# Patient Record
Sex: MALE | Race: WHITE | NOT HISPANIC OR LATINO | Employment: OTHER | RURAL
[De-identification: names, ages, dates, MRNs, and addresses within clinical notes are randomized per-mention and may not be internally consistent; named-entity substitution may affect disease eponyms.]

---

## 2019-11-12 ENCOUNTER — HISTORICAL (OUTPATIENT)
Dept: ADMINISTRATIVE | Facility: HOSPITAL | Age: 69
End: 2019-11-12

## 2019-11-14 LAB
LAB AP CLINICAL INFORMATION: NORMAL
LAB AP COMMENTS: NORMAL
LAB AP DIAGNOSIS - HISTORICAL: NORMAL
LAB AP GROSS PATHOLOGY - HISTORICAL: NORMAL
LAB AP SPECIMEN SUBMITTED - HISTORICAL: NORMAL

## 2020-10-15 ENCOUNTER — HISTORICAL (OUTPATIENT)
Dept: ADMINISTRATIVE | Facility: HOSPITAL | Age: 70
End: 2020-10-15

## 2020-10-15 LAB
ALBUMIN SERPL BCP-MCNC: 4 G/DL (ref 3.5–5)
ALBUMIN/GLOB SERPL: 1.1 {RATIO}
ALP SERPL-CCNC: 58 U/L (ref 45–115)
ALT SERPL W P-5'-P-CCNC: 60 U/L (ref 16–61)
ANION GAP SERPL CALCULATED.3IONS-SCNC: 8.8 MMOL/L (ref 7–16)
AST SERPL W P-5'-P-CCNC: 34 U/L (ref 15–37)
BASOPHILS # BLD AUTO: 0.03 X10E3/UL (ref 0–0.2)
BASOPHILS NFR BLD AUTO: 0.5 % (ref 0–1)
BILIRUB SERPL-MCNC: 0.6 MG/DL (ref 0–1.2)
BUN SERPL-MCNC: 16 MG/DL (ref 7–18)
BUN/CREAT SERPL: 12
CALCIUM SERPL-MCNC: 9.2 MG/DL (ref 8.5–10.1)
CHLORIDE SERPL-SCNC: 107 MMOL/L (ref 98–107)
CHOLEST SERPL-MCNC: 145 MG/DL
CHOLEST/HDLC SERPL: 3.5 {RATIO}
CO2 SERPL-SCNC: 30 MMOL/L (ref 21–32)
CREAT SERPL-MCNC: 1.31 MG/DL (ref 0.7–1.3)
EOSINOPHIL # BLD AUTO: 0.14 X10E3/UL (ref 0–0.5)
EOSINOPHIL NFR BLD AUTO: 2.1 % (ref 1–4)
ERYTHROCYTE [DISTWIDTH] IN BLOOD BY AUTOMATED COUNT: 12.1 % (ref 11.5–14.5)
EST. AVERAGE GLUCOSE BLD GHB EST-MCNC: 94 MG/DL
GLOBULIN SER-MCNC: 3.5 G/DL (ref 2–4)
GLUCOSE SERPL-MCNC: 114 MG/DL (ref 74–106)
HBA1C MFR BLD HPLC: 5.4 % (ref 4.5–6.6)
HCT VFR BLD AUTO: 48.4 % (ref 40–54)
HDLC SERPL-MCNC: 41 MG/DL
HGB BLD-MCNC: 16.2 G/DL (ref 13.5–18)
IMM GRANULOCYTES # BLD AUTO: 0.02 X10E3/UL (ref 0–0.04)
IMM GRANULOCYTES NFR BLD: 0.3 % (ref 0–0.4)
LDLC SERPL CALC-MCNC: 69 MG/DL
LYMPHOCYTES # BLD AUTO: 1.1 X10E3/UL (ref 1–4.8)
LYMPHOCYTES NFR BLD AUTO: 16.5 % (ref 27–41)
MAGNESIUM SERPL-MCNC: 2.4 MG/DL (ref 1.7–2.3)
MCH RBC QN AUTO: 33.4 PG (ref 27–31)
MCHC RBC AUTO-ENTMCNC: 33.5 G/DL (ref 32–36)
MCV RBC AUTO: 99.8 FL (ref 80–96)
MONOCYTES # BLD AUTO: 0.54 X10E3/UL (ref 0–0.8)
MONOCYTES NFR BLD AUTO: 8.1 % (ref 2–6)
MPC BLD CALC-MCNC: 10.4 FL (ref 9.4–12.4)
NEUTROPHILS # BLD AUTO: 4.82 X10E3/UL (ref 1.8–7.7)
NEUTROPHILS NFR BLD AUTO: 72.5 % (ref 53–65)
NRBC # BLD AUTO: 0 X10E3/UL (ref 0–0)
NRBC, AUTO (.00): 0 /100 (ref 0–0)
PLATELET # BLD AUTO: 213 X10E3/UL (ref 150–400)
POTASSIUM SERPL-SCNC: 4.8 MMOL/L (ref 3.5–5.1)
PROT SERPL-MCNC: 7.5 G/DL (ref 6.4–8.2)
RBC # BLD AUTO: 4.85 X10E6/UL (ref 4.6–6.2)
SODIUM SERPL-SCNC: 141 MMOL/L (ref 136–145)
TRIGL SERPL-MCNC: 173 MG/DL
WBC # BLD AUTO: 6.65 X10E3/UL (ref 4.5–11)

## 2021-05-20 LAB
LEFT EYE DM RETINOPATHY: NEGATIVE
RIGHT EYE DM RETINOPATHY: NEGATIVE

## 2021-07-08 DIAGNOSIS — I10 ESSENTIAL HYPERTENSION: Primary | ICD-10-CM

## 2021-07-08 RX ORDER — ACETAMINOPHEN 500 MG
TABLET ORAL NIGHTLY
COMMUNITY
End: 2024-01-22

## 2021-07-08 RX ORDER — IBUPROFEN 100 MG/5ML
1000 SUSPENSION, ORAL (FINAL DOSE FORM) ORAL 2 TIMES DAILY
COMMUNITY

## 2021-07-08 RX ORDER — CHOLECALCIFEROL (VITAMIN D3) 25 MCG
1000 TABLET ORAL DAILY
COMMUNITY

## 2021-07-08 RX ORDER — GLUCOSAMINE/CHONDRO SU A 500-400 MG
1 TABLET ORAL 3 TIMES DAILY
COMMUNITY

## 2021-07-08 RX ORDER — LISINOPRIL 20 MG/1
10 TABLET ORAL DAILY
COMMUNITY
End: 2021-07-08 | Stop reason: SDUPTHER

## 2021-07-08 RX ORDER — MAGNESIUM 250 MG
1 TABLET ORAL DAILY
COMMUNITY

## 2021-07-08 RX ORDER — QUINIDINE SULFATE 200 MG
1 TABLET ORAL DAILY
COMMUNITY
End: 2024-01-22

## 2021-07-08 RX ORDER — LISINOPRIL 20 MG/1
10 TABLET ORAL DAILY
Qty: 45 TABLET | Refills: 1 | Status: SHIPPED | OUTPATIENT
Start: 2021-07-08 | End: 2021-12-08 | Stop reason: SDUPTHER

## 2021-07-08 RX ORDER — NIFEDIPINE 30 MG/1
30 TABLET, FILM COATED, EXTENDED RELEASE ORAL DAILY
COMMUNITY
End: 2021-12-08 | Stop reason: SDUPTHER

## 2021-07-08 RX ORDER — ASPIRIN 81 MG/1
81 TABLET ORAL DAILY
COMMUNITY

## 2021-07-08 RX ORDER — ATORVASTATIN CALCIUM 10 MG/1
10 TABLET, FILM COATED ORAL DAILY
COMMUNITY
End: 2021-12-08 | Stop reason: SDUPTHER

## 2021-07-08 RX ORDER — MULTIVITAMIN
1 TABLET ORAL DAILY
COMMUNITY

## 2021-07-08 RX ORDER — UBIDECARENONE 30 MG
30 CAPSULE ORAL DAILY
COMMUNITY
End: 2021-12-08 | Stop reason: SDUPTHER

## 2021-07-08 RX ORDER — SILDENAFIL 50 MG/1
50 TABLET, FILM COATED ORAL DAILY PRN
COMMUNITY
End: 2024-01-22

## 2021-07-08 RX ORDER — PHENYLEPHRINE HCL 10 MG
500 TABLET ORAL DAILY
COMMUNITY
End: 2021-12-08 | Stop reason: SDUPTHER

## 2021-07-08 RX ORDER — AMOXICILLIN 500 MG
CAPSULE ORAL DAILY
COMMUNITY

## 2021-12-08 ENCOUNTER — OFFICE VISIT (OUTPATIENT)
Dept: FAMILY MEDICINE | Facility: CLINIC | Age: 71
End: 2021-12-08
Payer: MEDICARE

## 2021-12-08 VITALS
BODY MASS INDEX: 29.75 KG/M2 | OXYGEN SATURATION: 98 % | HEART RATE: 82 BPM | WEIGHT: 219.63 LBS | RESPIRATION RATE: 18 BRPM | DIASTOLIC BLOOD PRESSURE: 82 MMHG | HEIGHT: 72 IN | SYSTOLIC BLOOD PRESSURE: 118 MMHG | TEMPERATURE: 98 F

## 2021-12-08 DIAGNOSIS — E78.5 HYPERLIPIDEMIA, UNSPECIFIED HYPERLIPIDEMIA TYPE: ICD-10-CM

## 2021-12-08 DIAGNOSIS — E55.9 VITAMIN D DEFICIENCY: ICD-10-CM

## 2021-12-08 DIAGNOSIS — Z79.899 OTHER LONG TERM (CURRENT) DRUG THERAPY: ICD-10-CM

## 2021-12-08 DIAGNOSIS — I10 HYPERTENSION, UNSPECIFIED TYPE: Primary | ICD-10-CM

## 2021-12-08 DIAGNOSIS — E11.9 TYPE 2 DIABETES MELLITUS WITHOUT COMPLICATION, WITHOUT LONG-TERM CURRENT USE OF INSULIN: ICD-10-CM

## 2021-12-08 DIAGNOSIS — Z12.5 ENCOUNTER FOR SCREENING FOR MALIGNANT NEOPLASM OF PROSTATE: ICD-10-CM

## 2021-12-08 LAB
25(OH)D3 SERPL-MCNC: 20.1 NG/ML
ALBUMIN SERPL BCP-MCNC: 4.2 G/DL (ref 3.5–5)
ALBUMIN/GLOB SERPL: 1.1 {RATIO}
ALP SERPL-CCNC: 52 U/L (ref 45–115)
ALT SERPL W P-5'-P-CCNC: 69 U/L (ref 16–61)
AMYLASE SERPL-CCNC: 75 U/L (ref 25–115)
ANION GAP SERPL CALCULATED.3IONS-SCNC: 8 MMOL/L (ref 7–16)
AST SERPL W P-5'-P-CCNC: 28 U/L (ref 15–37)
BASOPHILS # BLD AUTO: 0.03 K/UL (ref 0–0.2)
BASOPHILS NFR BLD AUTO: 0.5 % (ref 0–1)
BILIRUB SERPL-MCNC: 0.4 MG/DL (ref 0–1.2)
BUN SERPL-MCNC: 16 MG/DL (ref 7–18)
BUN/CREAT SERPL: 11 (ref 6–20)
CALCIUM SERPL-MCNC: 9.2 MG/DL (ref 8.5–10.1)
CHLORIDE SERPL-SCNC: 104 MMOL/L (ref 98–107)
CHOLEST SERPL-MCNC: 237 MG/DL (ref 0–200)
CHOLEST/HDLC SERPL: 7 {RATIO}
CK SERPL-CCNC: 46 U/L (ref 39–308)
CO2 SERPL-SCNC: 32 MMOL/L (ref 21–32)
CREAT SERPL-MCNC: 1.4 MG/DL (ref 0.7–1.3)
DIFFERENTIAL METHOD BLD: ABNORMAL
EOSINOPHIL # BLD AUTO: 0.09 K/UL (ref 0–0.5)
EOSINOPHIL NFR BLD AUTO: 1.5 % (ref 1–4)
ERYTHROCYTE [DISTWIDTH] IN BLOOD BY AUTOMATED COUNT: 11.9 % (ref 11.5–14.5)
EST. AVERAGE GLUCOSE BLD GHB EST-MCNC: 107 MG/DL
GLOBULIN SER-MCNC: 4 G/DL (ref 2–4)
GLUCOSE SERPL-MCNC: 127 MG/DL (ref 74–106)
HBA1C MFR BLD HPLC: 5.8 % (ref 4.5–6.6)
HCT VFR BLD AUTO: 49.3 % (ref 40–54)
HDLC SERPL-MCNC: 34 MG/DL (ref 40–60)
HGB BLD-MCNC: 17 G/DL (ref 13.5–18)
IMM GRANULOCYTES # BLD AUTO: 0.02 K/UL (ref 0–0.04)
IMM GRANULOCYTES NFR BLD: 0.3 % (ref 0–0.4)
LDLC SERPL CALC-MCNC: ABNORMAL MG/DL
LDLC/HDLC SERPL: ABNORMAL {RATIO}
LIPASE SERPL-CCNC: 258 U/L (ref 73–393)
LYMPHOCYTES # BLD AUTO: 0.97 K/UL (ref 1–4.8)
LYMPHOCYTES NFR BLD AUTO: 15.7 % (ref 27–41)
MCH RBC QN AUTO: 33.7 PG (ref 27–31)
MCHC RBC AUTO-ENTMCNC: 34.5 G/DL (ref 32–36)
MCV RBC AUTO: 97.6 FL (ref 80–96)
MONOCYTES # BLD AUTO: 0.46 K/UL (ref 0–0.8)
MONOCYTES NFR BLD AUTO: 7.5 % (ref 2–6)
MPC BLD CALC-MCNC: 9.9 FL (ref 9.4–12.4)
NEUTROPHILS # BLD AUTO: 4.6 K/UL (ref 1.8–7.7)
NEUTROPHILS NFR BLD AUTO: 74.5 % (ref 53–65)
NONHDLC SERPL-MCNC: 203 MG/DL
NRBC # BLD AUTO: 0 X10E3/UL
NRBC, AUTO (.00): 0 %
PLATELET # BLD AUTO: 233 K/UL (ref 150–400)
POTASSIUM SERPL-SCNC: 4.6 MMOL/L (ref 3.5–5.1)
PROT SERPL-MCNC: 8.2 G/DL (ref 6.4–8.2)
PSA SERPL-MCNC: <0.01 NG/ML (ref 0–4.4)
RBC # BLD AUTO: 5.05 M/UL (ref 4.6–6.2)
SODIUM SERPL-SCNC: 139 MMOL/L (ref 136–145)
TRIGL SERPL-MCNC: 415 MG/DL (ref 35–150)
VLDLC SERPL-MCNC: ABNORMAL MG/DL
WBC # BLD AUTO: 6.17 K/UL (ref 4.5–11)

## 2021-12-08 PROCEDURE — 82550 ASSAY OF CK (CPK): CPT | Mod: ,,, | Performed by: CLINICAL MEDICAL LABORATORY

## 2021-12-08 PROCEDURE — 80061 LIPID PANEL: ICD-10-PCS | Mod: ,,, | Performed by: CLINICAL MEDICAL LABORATORY

## 2021-12-08 PROCEDURE — 83036 HEMOGLOBIN GLYCOSYLATED A1C: CPT | Mod: ,,, | Performed by: CLINICAL MEDICAL LABORATORY

## 2021-12-08 PROCEDURE — 85025 COMPLETE CBC W/AUTO DIFF WBC: CPT | Mod: ,,, | Performed by: CLINICAL MEDICAL LABORATORY

## 2021-12-08 PROCEDURE — 85025 CBC WITH DIFFERENTIAL: ICD-10-PCS | Mod: ,,, | Performed by: CLINICAL MEDICAL LABORATORY

## 2021-12-08 PROCEDURE — 99214 OFFICE O/P EST MOD 30 MIN: CPT | Mod: ,,, | Performed by: FAMILY MEDICINE

## 2021-12-08 PROCEDURE — 82306 VITAMIN D 25 HYDROXY: CPT | Mod: ,,, | Performed by: CLINICAL MEDICAL LABORATORY

## 2021-12-08 PROCEDURE — 83690 ASSAY OF LIPASE: CPT | Mod: ,,, | Performed by: CLINICAL MEDICAL LABORATORY

## 2021-12-08 PROCEDURE — G0103 PSA, SCREENING: ICD-10-PCS | Mod: ,,, | Performed by: CLINICAL MEDICAL LABORATORY

## 2021-12-08 PROCEDURE — 80061 LIPID PANEL: CPT | Mod: ,,, | Performed by: CLINICAL MEDICAL LABORATORY

## 2021-12-08 PROCEDURE — 83690 LIPASE: ICD-10-PCS | Mod: ,,, | Performed by: CLINICAL MEDICAL LABORATORY

## 2021-12-08 PROCEDURE — 80053 COMPREHENSIVE METABOLIC PANEL: ICD-10-PCS | Mod: ,,, | Performed by: CLINICAL MEDICAL LABORATORY

## 2021-12-08 PROCEDURE — 83036 HEMOGLOBIN A1C: ICD-10-PCS | Mod: ,,, | Performed by: CLINICAL MEDICAL LABORATORY

## 2021-12-08 PROCEDURE — 82306 VITAMIN D: ICD-10-PCS | Mod: ,,, | Performed by: CLINICAL MEDICAL LABORATORY

## 2021-12-08 PROCEDURE — 82150 ASSAY OF AMYLASE: CPT | Mod: ,,, | Performed by: CLINICAL MEDICAL LABORATORY

## 2021-12-08 PROCEDURE — 99214 PR OFFICE/OUTPT VISIT, EST, LEVL IV, 30-39 MIN: ICD-10-PCS | Mod: ,,, | Performed by: FAMILY MEDICINE

## 2021-12-08 PROCEDURE — 82550 CK: ICD-10-PCS | Mod: ,,, | Performed by: CLINICAL MEDICAL LABORATORY

## 2021-12-08 PROCEDURE — 82150 AMYLASE: ICD-10-PCS | Mod: ,,, | Performed by: CLINICAL MEDICAL LABORATORY

## 2021-12-08 PROCEDURE — G0103 PSA SCREENING: HCPCS | Mod: ,,, | Performed by: CLINICAL MEDICAL LABORATORY

## 2021-12-08 PROCEDURE — 80053 COMPREHEN METABOLIC PANEL: CPT | Mod: ,,, | Performed by: CLINICAL MEDICAL LABORATORY

## 2021-12-08 RX ORDER — GLUCOSAMINE/CHONDR SU A SOD 750-600 MG
1 TABLET ORAL EVERY OTHER DAY
COMMUNITY

## 2021-12-08 RX ORDER — METFORMIN HYDROCHLORIDE 500 MG/1
500 TABLET ORAL DAILY
Qty: 90 TABLET | Refills: 1 | Status: SHIPPED | OUTPATIENT
Start: 2021-12-08 | End: 2022-05-13

## 2021-12-08 RX ORDER — ATORVASTATIN CALCIUM 10 MG/1
TABLET, FILM COATED ORAL
Qty: 60 TABLET | Refills: 1 | Status: SHIPPED | OUTPATIENT
Start: 2021-12-08 | End: 2022-03-08

## 2021-12-08 RX ORDER — DEXTROSE 4 G
1 TABLET,CHEWABLE ORAL DAILY
COMMUNITY
Start: 2021-09-28

## 2021-12-08 RX ORDER — NIFEDIPINE 30 MG/1
30 TABLET, FILM COATED, EXTENDED RELEASE ORAL DAILY
Qty: 90 TABLET | Refills: 1 | Status: SHIPPED | OUTPATIENT
Start: 2021-12-08

## 2021-12-08 RX ORDER — IBUPROFEN 400 MG/1
400 TABLET ORAL EVERY 6 HOURS PRN
COMMUNITY

## 2021-12-08 RX ORDER — ACETAMINOPHEN 500 MG
1 TABLET ORAL NIGHTLY PRN
COMMUNITY
End: 2024-01-22

## 2021-12-08 RX ORDER — EPINEPHRINE 0.22MG
1 AEROSOL WITH ADAPTER (ML) INHALATION DAILY
COMMUNITY

## 2021-12-08 RX ORDER — LISINOPRIL 10 MG/1
10 TABLET ORAL DAILY
Qty: 90 TABLET | Refills: 1 | Status: SHIPPED | OUTPATIENT
Start: 2021-12-08 | End: 2022-03-08 | Stop reason: SDUPTHER

## 2021-12-08 RX ORDER — NIFEDIPINE 30 MG/1
1 TABLET, FILM COATED, EXTENDED RELEASE ORAL DAILY
COMMUNITY
End: 2021-12-08 | Stop reason: SDUPTHER

## 2021-12-08 RX ORDER — LISINOPRIL 10 MG/1
1 TABLET ORAL DAILY
COMMUNITY
End: 2021-12-08 | Stop reason: SDUPTHER

## 2021-12-08 RX ORDER — OMEGA-3/DHA/EPA/FISH OIL 300-1000MG
1 CAPSULE,DELAYED RELEASE (ENTERIC COATED) ORAL EVERY OTHER DAY
COMMUNITY
End: 2024-01-22

## 2021-12-08 RX ORDER — UBIDECARENONE 75 MG
1 CAPSULE ORAL DAILY
COMMUNITY

## 2021-12-08 RX ORDER — CALCIUM CARBONATE 260MG(650)
1 TABLET,CHEWABLE ORAL DAILY
COMMUNITY

## 2021-12-08 RX ORDER — PHENYLEPHRINE HCL 10 MG
2 TABLET ORAL DAILY
COMMUNITY

## 2021-12-08 RX ORDER — IBUPROFEN 100 MG/5ML
1 SUSPENSION, ORAL (FINAL DOSE FORM) ORAL DAILY
COMMUNITY
End: 2021-12-08 | Stop reason: SDUPTHER

## 2021-12-22 DIAGNOSIS — Z87.891 FORMER CIGARETTE SMOKER: Primary | ICD-10-CM

## 2022-01-03 ENCOUNTER — HOSPITAL ENCOUNTER (OUTPATIENT)
Dept: RADIOLOGY | Facility: HOSPITAL | Age: 72
Discharge: HOME | End: 2022-01-03
Attending: FAMILY MEDICINE
Payer: MEDICARE

## 2022-01-03 DIAGNOSIS — Z87.891 FORMER CIGARETTE SMOKER: ICD-10-CM

## 2022-01-03 PROCEDURE — 76706 US ABDL AORTA SCREEN AAA: CPT | Mod: TC

## 2022-01-10 ENCOUNTER — PATIENT OUTREACH (OUTPATIENT)
Dept: FAMILY MEDICINE | Facility: CLINIC | Age: 72
End: 2022-01-10
Payer: MEDICARE

## 2022-01-10 ENCOUNTER — OFFICE VISIT (OUTPATIENT)
Dept: FAMILY MEDICINE | Facility: CLINIC | Age: 72
End: 2022-01-10
Payer: MEDICARE

## 2022-01-10 VITALS
BODY MASS INDEX: 30.21 KG/M2 | OXYGEN SATURATION: 98 % | RESPIRATION RATE: 18 BRPM | DIASTOLIC BLOOD PRESSURE: 70 MMHG | HEIGHT: 71 IN | TEMPERATURE: 98 F | SYSTOLIC BLOOD PRESSURE: 132 MMHG | WEIGHT: 215.81 LBS | HEART RATE: 73 BPM

## 2022-01-10 DIAGNOSIS — E11.9 TYPE 2 DIABETES MELLITUS WITHOUT COMPLICATION, WITHOUT LONG-TERM CURRENT USE OF INSULIN: Primary | ICD-10-CM

## 2022-01-10 DIAGNOSIS — I10 PRIMARY HYPERTENSION: ICD-10-CM

## 2022-01-10 DIAGNOSIS — M15.9 PRIMARY OSTEOARTHRITIS INVOLVING MULTIPLE JOINTS: ICD-10-CM

## 2022-01-10 DIAGNOSIS — I25.10 MULTIPLE VESSEL CORONARY ARTERY DISEASE: ICD-10-CM

## 2022-01-10 PROCEDURE — G0439 PR MEDICARE ANNUAL WELLNESS SUBSEQUENT VISIT: ICD-10-PCS | Mod: ,,, | Performed by: FAMILY MEDICINE

## 2022-01-10 PROCEDURE — G0439 PPPS, SUBSEQ VISIT: HCPCS | Mod: ,,, | Performed by: FAMILY MEDICINE

## 2022-01-10 RX ORDER — IVERMECTIN 3 MG/1
22.75 TABLET ORAL WEEKLY
COMMUNITY

## 2022-01-10 NOTE — PATIENT INSTRUCTIONS
Counseling and Referral of Other Preventative  (Italic type indicates deductible and co-insurance are waived)    Patient Name: Darrell Urbina  Today's Date: 1/10/2022    Health Maintenance       Date Due Completion Date    Eye Exam Never done ---    Hepatitis C Screening 01/10/2022 (Originally 1950) ---    Influenza Vaccine (1) 06/30/2022 (Originally 9/1/2021) ---    COVID-19 Vaccine (1) 12/08/2022 (Originally 1/31/1955) ---    Diabetes Urine Screening 12/08/2022 (Originally 4/20/2022) 4/20/2021    TETANUS VACCINE 01/10/2023 (Originally 1/31/1968) ---    Shingles Vaccine (2 of 3) 01/10/2023 (Originally 1/22/2014) 11/27/2013    Pneumococcal Vaccines (Age 65+) (3 of 4 - PPSV23) 01/10/2023 (Originally 12/10/2019) 10/15/2019    Hemoglobin A1c 03/08/2022 12/8/2021    PROSTATE-SPECIFIC ANTIGEN 12/08/2022 12/8/2021    Foot Exam 12/08/2022 12/8/2021    Override on 12/8/2021: Done    Lipid Panel 12/08/2022 12/8/2021    Colorectal Cancer Screening 02/05/2023 2/5/2018        No orders of the defined types were placed in this encounter.  Patient Education       Preventing Falls   The Basics   Written by the doctors and editors at [employer]   Am I at risk of falling? -- Your risk of falling increases as you grow older. That's because getting older can make it harder to walk steadily and keep your balance. Also, the effects of falls are more serious in older people.  Overall, 3 to 4 out of every 10 people over the age of 65 fall each year. Up to 75 percent of people who fracture a hip never recover to the point they were before they had their fracture. If you have fallen in the past, you are at higher risk of falling again.  Several things can increase your risk of a fall, including:  · Illness  · A change in the medicines you take  · An unsafe or unfamiliar setting (for example, a room with rugs or furniture that might trip you, or an area you don't know well)  How can my doctor help me to avoid falling? -- Your doctor can  talk to you about the following things:  · Past falls - It is important to tell your doctor about any times you have fallen or almost fallen. He or she can then suggest ways to prevent another fall.  · Your health conditions - Some health problems can put you at risk of falling. These include conditions that affect eyesight, hearing, muscle strength, or balance.  · The medicines you take - Certain medicines can increase the risk of falling. These include some medicines that are used for sleeping problems, anxiety, high blood pressure, or depression. Adding new medicines, or changing doses of some medicines, can also affect your risk of falling.  The more your doctor knows about your situation, the better he or she will be able to help you. For example, if you fell because you have a condition that causes pain, your doctor might suggest treatments to deal with the pain. Or if one of your medicines is making you dizzy and more likely to fall, your doctor might switch you to a different medicine.  Is there anything I can do on my own? -- Yes. To help keep from falling, you can:  · Make your home safer - To avoid falling at home, get rid of things that might make you trip or slip. This might include furniture, electrical cords, clutter, and loose rugs (figure 1). Keep your home well-lit so that you can easily see where you are going. Avoid storing things in high places so you don't have to reach or climb.  · Wear sturdy shoes that fit well - Wearing shoes with high heels or slippery soles, or shoes that are too loose, can lead to falls. Walking around in bare feet, or only socks, can also increase your risk of falling.  · Take vitamin D pills - Taking vitamin D might lower the risk of falls in older people. This is because vitamin D helps make bones and muscles stronger. Your doctor can talk to you about whether you should take extra vitamin D, and how much.  · Stay active - Exercising on a regular basis can help lower  your risk of falling. It might also help prevent you from getting hurt if you do fall. It is best to do a few different activities that help with both strength and balance. There are many kinds of exercise that can be safe for older people. These include walking, swimming, and Joshua Chi (a Chinese martial art that involves slow, gentle movements).  · Use a cane, walker, and other safety devices - If your doctor recommends that you use a cane or walker, be sure that it's the right size and you know how to use it. There are other devices that might help you avoid falling, too. These include grab bars or a sturdy seat for the shower, non-slip bath mats, and hand rails or treads for the stairs (to prevent slipping).  If you worry that you could fall, there are also alarm buttons that let you call for help if you fall and can't get up.  What should I do if I fall? -- If you fall, see your doctor right away, even if you aren't hurt. Your doctor can try to figure out what caused you to fall, and how likely you are to fall again. He or she will do an exam and talk to you about your health problems, medicines, and activities. Then he or she can suggest things you can do to avoid falling again.  Many older people have a hard time recovering after a fall. Doing things to prevent falling can help you to protect your health and independence.  All topics are updated as new evidence becomes available and our peer review process is complete.  This topic retrieved from [employer] on: Sep 21, 2021.  Topic 11561 Version 18.0  Release: 29.4.2 - C29.263  © 2021 UpToDate, Inc. and/or its affiliates. All rights reserved.  figure 1: How to avoid falling at home     This picture shows some of the things that can cause a fall in your home. Look around and remove any loose rugs, electrical cords, clutter, or furniture that could trip you.  Graphic 30352 Version 1.0    Consumer Information Use and Disclaimer   This information is not specific  medical advice and does not replace information you receive from your health care provider. This is only a brief summary of general information. It does NOT include all information about conditions, illnesses, injuries, tests, procedures, treatments, therapies, discharge instructions or life-style choices that may apply to you. You must talk with your health care provider for complete information about your health and treatment options. This information should not be used to decide whether or not to accept your health care provider's advice, instructions or recommendations. Only your health care provider has the knowledge and training to provide advice that is right for you. The use of this information is governed by the [employer] End User License Agreement, available at [email or web address]://[email or web address]/[email or web address]/jasper.The use of [employer] content is governed by the [employer] Terms of Use. ©2021 UpToDate, Inc. All rights reserved.  Copyright   © 2021 UpToDate, Inc. and/or its affiliates. All rights reserved.  Patient Education       Exercise and Movement   The Basics   Written by the doctors and editors at [employer]   What are the benefits of movement? -- Moving your body has many benefits. It can:  · Burn calories, which helps people control their weight  · Help control blood sugar levels in people with diabetes  · Lower blood pressure, especially in people with high blood pressure  · Lower stress and help with depression and anxiety  · Keep bones strong, so they don't get thin and break easily  · Lower the chance of dying from heart disease  Adding even small amounts of physical activity to your daily routine can improve your health.  What are the main types of exercise? -- There are 3 main types of exercise. They are:  · Aerobic exercise - Aerobic exercise raises a person's heart rate. Examples of aerobic exercise are walking, running, dancing, riding a bike, or swimming.  · Resistance training -  Resistance training helps make your muscles stronger. People can do this type of exercise using weights, exercise bands, or weight machines. You can also do this type of exercise using your own body weight, as with push-ups, or by lifting items in your home, like jugs of water.   · Stretching - Stretching exercises help your muscles and joints move more easily.  It's important to have all 3 types of exercise in your exercise program. That way, your body, muscles, and joints can be as healthy as possible.  Should I talk to my doctor or nurse before I start exercising? -- If you have not exercised before or have not exercised in a long time, talk with your doctor or nurse before you start a very active exercise program.  If you have heart disease or risk factors for heart disease (like high blood pressure or diabetes), your doctor or nurse might recommend that you have an exercise test before starting an exercise program.  When you start an exercise program, start slowly. For example, do the exercise at a slow pace or for a few minutes only. Over time, you can exercise faster and for longer periods of time.  What should I do when I exercise? -- Each time you exercise, you should:  · Warm up - Warming up can help keep you from hurting your muscles when you exercise. To warm up, do a light aerobic exercise (such as walking slowly) or stretch for 5 to 10 minutes.  · Work out - You should try to get a mix of aerobic exercise, resistance training, and stretching. During an aerobic workout, you can walk fast, swim, run, or use an exercise machine, for example. Other activities, like dancing or playing tennis, are also forms of aerobic exercise. You should also take time to stretch all of your joints, including your neck, shoulders, back, hips, and knees. At least 2 times a week, you can do resistance training exercises as part of your workout.  · Cool down - Cooling down helps keep you from feeling dizzy after you exercise  and helps prevent muscle cramps. To cool down, you can stretch or do a light aerobic exercise for 5 minutes.  Some people go to a gym or do group exercise classes. But you can exercise even without these things. Some exercises can be done even in a small space. You can also try online videos or smartphone apps to get ideas for different types of exercise.   How often should I exercise? -- Doctors recommend that people exercise at least 30 minutes a day, on 5 or more days of the week.  If you can't exercise for 30 minutes straight, try to exercise for 10 minutes at a time, 3 or 4 times a day. Even exercising for shorter amounts of time is good for you, especially if it means spending less time sitting.   When should I call my doctor or nurse? -- If you have any of the following symptoms when you exercise, stop exercising and call your doctor or nurse right away:  · Pain or pressure in your chest, arms, throat, jaw, or back  · Nausea or vomiting  · Feeling like your heart is fluttering or racing very fast  · Feeling dizzy or faint  What if I don't have time to exercise? -- Many people have very busy lives and might not think that they have time to exercise. But it's important to try to find time to exercise, even if you are tired or work a lot. Exercise can increase your energy level, which can make you feel better and might even help you get more work done.  Even if it's hard to set aside a lot of time to exercise, you can still improve your health by moving your body more. There are many ways that you can be more active. For example, you can:  · Take the stairs instead of the elevator  · Park in a parking space that is farther away from the door  · Take a longer route when you walk from one place to another  Spending a lot of time sitting still - for example, watching television or working on the computer - can be bad for your health. Try to get up and move around whenever you can. Even small amounts of movement,  like taking short walks, doing household chores, or gardening, can help improve your health. Finding activities you enjoy, or doing them with other people, can help you add more movement into your daily life.  What else should I do when I exercise? -- To exercise safely and avoid problems, it's important to:  · Drink fluids during and after exercising (but avoid drinks with a lot of caffeine or sugar)  · Avoid exercising outside if it is too hot or cold  · Wear layers of clothes, so that you can take them off if you get too hot  · Wear shoes that fit well and support your feet  · Be aware of your surroundings if you exercise outside  All topics are updated as new evidence becomes available and our peer review process is complete.  This topic retrieved from [employer] on: Sep 21, 2021.  Topic 66531 Version 21.0  Release: 29.4.2 - C29.263  © 2021 UpToDate, Inc. and/or its affiliates. All rights reserved.  Consumer Information Use and Disclaimer   This information is not specific medical advice and does not replace information you receive from your health care provider. This is only a brief summary of general information. It does NOT include all information about conditions, illnesses, injuries, tests, procedures, treatments, therapies, discharge instructions or life-style choices that may apply to you. You must talk with your health care provider for complete information about your health and treatment options. This information should not be used to decide whether or not to accept your health care provider's advice, instructions or recommendations. Only your health care provider has the knowledge and training to provide advice that is right for you. The use of this information is governed by the [employer] End User License Agreement, available at https://[email or web address]/en/solutions/[employer]/about/jasper.The use of [employer] content is governed by the [employer] Terms of Use. ©2021 UpToDate, Inc. All rights  "reserved.  Copyright   © 2021 UpToDate, Inc. and/or its affiliates. All rights reserved.  Patient Education       Diabetes and Diet   The Basics   Written by the doctors and editors at [employer]   Why is diet important in diabetes? -- Diet is important because it is part of diabetes treatment. Many people need to change what they eat and how much they eat to help treat their diabetes. It is important for people to treat their diabetes so that they:  · Keep their blood sugar at or near a normal level  · Prevent long-term problems, such as heart or kidney problems, that can happen in people with diabetes  Changing your diet can also help treat obesity, high blood pressure, and high cholesterol. These conditions can affect people with diabetes and can lead to future problems, such as heart attacks or strokes.  Who will work with me to change my diet? -- Your doctor or nurse will work with you to make a food plan to change your diet. They might also recommend that you work with a "dietitian." A dietitian is an expert on food and eating.  Do I need to eat at the same times every day? -- When and how often you should eat depends, in part, on the diabetes medicines you take. For example:  · People who take about the same amount of insulin at the same time each day (called a "fixed regimen") should eat meals at the same times. This is also true for people who take pills that increase insulin levels, such as sulfonylureas. Eating meals at the same time every day helps prevent low blood sugar.  · People who adjust the dose and timing of their insulin each day (called a "flexible regimen") do not always have to eat meals at the same time. That's because they can time their insulin dose for before they plan to eat, and also adjust the dose for how much they plan to eat.  · People who take medicines that don't usually cause low blood sugar, such as metformin, don't have to eat meals at the same time every day.  What do I need to " "think about when planning what to eat? -- Our bodies break down the food we eat into small pieces called carbohydrates, proteins, and fats.  When planning what to eat, people with diabetes need to think about:  · Carbohydrates (or "carbs") - Carbohydrates, which are sugars that our bodies use for energy, can raise a person's blood sugar level. Your doctor, nurse, or dietitian will tell you how many carbohydrates you should eat at each meal or snack. Foods that have carbohydrates include:  ? Bread, pasta, and rice  ? Vegetables and fruits  ? Dairy foods  ? Foods and drinks with added sugar  It is best to get your carbohydrates from fruits, vegetables, whole grains, and low-fat milk. It is best to avoid drinks with added sugar, like soda, juices, and sports drinks.   · Protein - Your doctor, nurse, or dietitian will tell you how much protein you should eat each day. It is best to eat lean meats, fish, eggs, beans, peas, soy products, nuts, and seeds.  · Fats - The type of fat you eat is more important than the amount of fat. "Saturated" and "trans" fats can increase your risk for heart problems, like a heart attack.  ? Foods that have saturated fats include meat, butter, cheese, and ice cream.  ? Foods that have trans fats include processed food with "partially hydrogenated oils" on the ingredient list. This may include fried foods, store bought cookies, muffins, pies, and cakes.  "Monounsaturated" and "polyunsaturated" fats are better for you. Foods with these types of fat include fish, avocado, olive oil, and nuts.  · Calories - People need to eat a certain amount of calories each day to keep their weight the same. People who are overweight and want to lose weight need to eat fewer calories each day.  · Fiber - Eating foods with a lot of fiber can help control a person's blood sugar level. Foods that have a lot of fiber include apples, green beans, peas, beans, lentils, nuts, oatmeal, and whole grains.  · Salt - " People who have high blood pressure should not eat foods that contain a lot of salt (also called sodium). People with high blood pressure should also eat healthy foods, such as fruits, vegetables, and low-fat dairy foods.  · Alcohol - Having more than 1 drink (for women) or 2 drinks (for men) a day can raise blood sugar levels. Also, drinks that have fruit juice or soda in them can raise blood sugar levels.  What can I do if I need to lose weight? -- If you need to lose weight, you can:  · Exercise - Try to get at least 30 minutes of physical activity a day, most days of the week. Even gentle exercise, like walking, is good for your health. Some people with diabetes need to change their medicine dose before they exercise. They might also need to check their blood sugar levels before and after exercising.  · Eat fewer calories - Your doctor, nurse, or dietitian can tell you how many calories you should eat each day in order to lose weight.  If you are worried about your weight, size, or shape, talk with your doctor, nurse, or dietitian. They can help you make changes to improve your health.  Can I eat the same foods as my family? -- Yes. You do not need to eat special foods if you have diabetes. You and your family can eat the same foods. Changing your diet is mostly about eating healthy foods and not eating too much.  What are the other parts of diabetes treatment? -- Besides changing your diet, the other parts of diabetes treatment are:  · Exercise  · Medicines  Some people with diabetes need to learn how to match their diet and exercise with their medicine dose. For example, people who use insulin might need to choose the dose of insulin they give themselves. To choose their dose, they need to think about:  · What they plan to eat at the next meal  · How much exercise they plan to do  · What their blood sugar level is  If the diet and exercise do not match the medicine dose, a person's blood sugar level can get  too low or too high. Blood sugar levels that are too low or too high can cause problems.  All topics are updated as new evidence becomes available and our peer review process is complete.  This topic retrieved from [employer] on: Sep 21, 2021.  Topic 37826 Version 7.0  Release: 29.4.2 - C29.263  © 2021 UpToDate, Inc. and/or its affiliates. All rights reserved.  Consumer Information Use and Disclaimer   This information is not specific medical advice and does not replace information you receive from your health care provider. This is only a brief summary of general information. It does NOT include all information about conditions, illnesses, injuries, tests, procedures, treatments, therapies, discharge instructions or life-style choices that may apply to you. You must talk with your health care provider for complete information about your health and treatment options. This information should not be used to decide whether or not to accept your health care provider's advice, instructions or recommendations. Only your health care provider has the knowledge and training to provide advice that is right for you. The use of this information is governed by the [employer] End User License Agreement, available at https://www.[employer]/en/solutions/[employer]/about/jasper.The use of [employer] content is governed by the [employer] Terms of Use. ©2021 UpToDate, Inc. All rights reserved.  Copyright   © 2021 UpToDate, Inc. and/or its affiliates. All rights reserved.    Patient Education       Advance Directives   The Basics   Written by the doctors and editors at [facility]   What are advance directives? -- Advance directives are legal documents that allow you to spell out ahead of time what of types medical care you would want if you ever became unable to speak for yourself. These documents can help ensure that you get the care you want even if you have an unexpected serious illness or accident. The documents can also make things  easier for the people who will need to make decisions for you if you ever become unable to make them for yourself.  Are there different kinds of advance directives? -- Yes. The most useful kinds of advance directives are:  · Health care proxy (also called the durable power of  for health care) - The health care proxy document allows you to choose someone to make medical decisions for you if you become unable to speak for yourself. The benefit of having this document is that it makes your choice of a decision-maker clear to your doctors and family members. When you choose a health care proxy, it is important to talk to the person you choose about the things that you do or don't want. That way your decision-maker knows what to do later on if they ever have to speak for you.  · Living will - A living will is the document that tells health care providers what type of care you want if you become unable to speak for yourself. For instance, a living will allows you to record in writing whether you would want a feeding tube put in if you had a serious illness or accident.  · Do not resuscitate/do not intubate order (also called a DNR/DNI) - If you decide you do not want your heart restarted if it stops and you do not want a breathing tube put in if you stop breathing, you can ask for a DNR/DNI. This is a form that must be signed by a doctor. It tells all your health care providers that you have decided you do not want these treatments.  Advance directives work best when they are part of a team effort that includes not only the person making the decisions, but also doctors, emergency health workers, and places like hospitals and nursing homes.  The Physician Orders for Life Sustaining Treatment (POLST) is a form for people who already have a serious illness or are very weak and likely to need medical help. The POLST form spells out exactly what care should be given, and not given, based on your choices and wishes. It  "is signed by your doctor, and you can keep a copy at home to be used in the event of an emergency. A copy is also kept on file at any hospital or other place where you might get medical care. Not every state has a POLST program. To find out if your state has one, you can go online to www.polst.org.  How do I choose a health care proxy? -- Choose someone who:  · You know and trust  · Can separate their own wishes from yours  · You know would carry out your wishes if that became necessary  · Could be easily reached if they were needed  · Could handle it if other family members or loved ones wanted you to get treated differently than you would want  Some people choose a second person as an alternate proxy, in case their first choice cannot be reached at the time decisions need to be made.  Who should have an advance directive? -- Advance directives are a good idea for anyone, but they are especially important if:  · You are older than 65.  · You have a serious life-threatening illness, such as advanced cancer, or end-stage heart or liver failure.  · You want to make sure you can choose the person you would like as your health care proxy (decision-maker).  What kinds of decisions will I need to make? -- Your advance directives can have as much or as little detail as you want. But many people who have advance directives record their wishes about the following treatments:  · Breathing tubes - If you stop breathing or are having a very hard time breathing, you can get attached to a machine that will help you breathe. For that to happen, you will have to be "intubated." That means that a tube will be put down your throat and into your lungs. Then the tube will be connected to a breathing machine. When the tube is in place, you will not be able to talk, at least at first. Plus, you will probably be sedated, meaning that you are on medicines that make you sleep.  Sometimes a breathing machine is needed only for a short time. " "For instance, some people need the breathing machine just while they recover from a lung infection. When deciding about a breathing tube, consider whether you would want it at all, want it only for a short time, or want it no matter what. Also, keep in mind that any time a breathing machine is used, it is hard to know for sure if and when it will be able to be disconnected.  · Cardiopulmonary resuscitation (CPR) - If your heart stops beating suddenly, doctors might be able to restart it by pumping on your chest, putting in a breathing tube and pushing air into your lungs, giving you an electric shock (called "defibrillation"), and/or giving you special medicines. Some people recover completely after having their heart restarted. Others have permanent brain damage from a lack of blood flow to the brain; this is most likely in people who have an advanced, serious illness.  · Feeding tubes - If you become unable to eat, you can have a tube put into your stomach or intestines that can deliver nutrients. A feeding tube can keep a person's body going while they heal and gets strong. But it can also keep a person alive for a long time even if there is no chance the person will recover.  Can I change my mind? -- Yes. You can change your mind at any time. If you sign an advance directive and you decide you want a different kind of treatment or you no longer want the health care proxy you chose, all you have to do is tell your doctor or nurse about your new decision. If you want to name a new health care proxy or want to record new wishes, you can draw up new documents.  How can I draw up an advance directive? -- The following table lists resources that can help you learn more about making your own advance directives (table 1).  All topics are updated as new evidence becomes available and our peer review process is complete.  This topic retrieved from [employer] on: Sep 21, 2021.  Topic 94609 Version 13.0  Release: 29.4.2 - " C29.263  © 2021 UpToDate, Inc. and/or its affiliates. All rights reserved.  table 1: Resources that can help you make advance directives     Address  Phone number  Website    [facility]  [address] [place] [address] Toll-free: (861) PTE-AAR  [(989) 858-2316] http://assets.aar.org/external_sites/ caregiving/multimedia/EG_AdvanceDirectives.html    Aging with Dignity (Five Wishes form)  [address]  [place] [address] Toll-free: (823) 5WISHES  [([phone] [phone]] www.agingwithdignity.org    [employer]    Toll-free: (306) 684-9068 www.caringinfo.org    [employer]  c[employer]o [employer]  [address] [place] [address] [phone] www.[employer]    Graphic 43334 Version 7.0  Consumer Information Use and Disclaimer   This information is not specific medical advice and does not replace information you receive from your health care provider. This is only a brief summary of general information. It does NOT include all information about conditions, illnesses, injuries, tests, procedures, treatments, therapies, discharge instructions or life-style choices that may apply to you. You must talk with your health care provider for complete information about your health and treatment options. This information should not be used to decide whether or not to accept your health care provider's advice, instructions or recommendations. Only your health care provider has the knowledge and training to provide advice that is right for you. The use of this information is governed by the [employer] End User License Agreement, available at [email or web address].dentaZOOM/en/[email or web address]/[email or web address]/[email or web address].The use of [employer] content is governed by the [employer] Terms of Use. ©2021 UpToDate, Inc. All rights reserved.  Copyright   © 2021 UpToDate, Inc. and/or its affiliates. All rights reserved.

## 2022-01-10 NOTE — PROGRESS NOTES
[facility]     PATIENT NAME: Darrell Urbina   : 1950    AGE: 71 y.o. DATE: 01/10/2022   MRN: [id number]        Reason for Visit / Chief Complaint: Medicare AWV (MEDICARE AWV-SUBS )        Darrell Urbina presents for a subsequent Medicare AWV today. No complaints     The following components were reviewed and updated:    Medical/Social/Family History:  Past Medical History:   Diagnosis Date    Diabetes mellitus, type 2     Elevated PSA     Hyperlipidemia     Hypertension     Hypomagnesemia     Multiple vessel coronary artery disease     OA (osteoarthritis)     Prostate cancer         Family History   Problem Relation Age of Onset    Heart disease Father     Hearing loss Father     Hyperlipidemia Father     Hypertension Father     Osteoarthritis Father     Sudden death Father     Migraines Daughter     Lupus Daughter     Hypertension Mother         Social History     Tobacco Use   Smoking Status Former Smoker    Packs/day: 1.00    Years: 20.00    Pack years: 20.00    Types: Cigarettes    Quit date:     Years since quittin.0   Smokeless Tobacco Never Used      Social History     Substance and Sexual Activity   Alcohol Use Yes    Comment: occ       Family History   Problem Relation Age of Onset    Heart disease Father     Hearing loss Father     Hyperlipidemia Father     Hypertension Father     Osteoarthritis Father     Sudden death Father     Migraines Daughter     Lupus Daughter     Hypertension Mother        Past Surgical History:   Procedure Laterality Date    APPENDECTOMY      COLONOSCOPY  2018    CORONARY ARTERY BYPASS GRAFT      double bypass surgery      FINGER SURGERY Left     FRACTURE SURGERY      right foot    PROSTATE SURGERY      right knee surgery           · Allergies and Current Medications     Review of patient's allergies indicates:   Allergen Reactions    Hydrocodone      Other reaction(s):  itching       Current Outpatient Medications:     ascorbic acid, vitamin C, (VITAMIN C) 1000 MG tablet, Take 1,000 mg by mouth once daily., Disp: , Rfl:     aspirin (ECOTRIN) 81 MG EC tablet, Take 81 mg by mouth once daily., Disp: , Rfl:     blood-glucose meter Misc, 1 Units by Misc.(Non-Drug; Combo Route) route once daily. Checking about 3x weekly, Disp: , Rfl:     cinnamon bark 500 mg capsule, Take 2 capsules by mouth once daily., Disp: , Rfl:     coenzyme Q10 100 mg capsule, Take 1 capsule by mouth once daily., Disp: , Rfl:     cyanocobalamin 500 MCG tablet, Take 1 tablet by mouth once daily., Disp: , Rfl:     glucosamine HCl 1,500 mg Tab, Take 1 tablet by mouth every other day., Disp: , Rfl:     ivermectin (STROMECTOL) 3 mg Tab, Take 22.75 mg by mouth once a week., Disp: , Rfl:     lisinopriL 10 MG tablet, Take 1 tablet (10 mg total) by mouth once daily., Disp: 90 tablet, Rfl: 1    metFORMIN (GLUCOPHAGE) 500 MG tablet, Take 1 tablet (500 mg total) by mouth once daily., Disp: 90 tablet, Rfl: 1    multivitamin (THERAGRAN) per tablet, Take 1 tablet by mouth once daily., Disp: , Rfl:     NIFEdipine (ADALAT CC) 30 MG TbSR, Take 1 tablet (30 mg total) by mouth once daily., Disp: 90 tablet, Rfl: 1    omega-3 fatty acids/fish oil (FISH OIL-OMEGA-3 FATTY ACIDS) 300-1,000 mg capsule, Take by mouth once daily., Disp: , Rfl:     potassium 99 mg Tab, Take 1 tablet by mouth 2 (two) times a day., Disp: , Rfl:     TURMERIC ORAL, Take 1 tablet by mouth once daily., Disp: , Rfl:     vitamin D (VITAMIN D3) 1000 units Tab, Take 1,000 Units by mouth once daily., Disp: , Rfl:     zinc gluconate 10 mg Lozg, Take 1 tablet by mouth once daily., Disp: , Rfl:     atorvastatin (LIPITOR) 10 MG tablet, 10mg MWF and 5mg STTS, Disp: 60 tablet, Rfl: 1    chromium-brindal berry (GARCINIA CAMBOGIA) 200-500 mcg-mg Tab, Take 1 tablet by mouth once daily., Disp: , Rfl:     fish,bora,flax oils-om3,6,9no1 400-400-400 mg Cap, Take 1  capsule by mouth every other day. 3,5,6,7,9, Disp: , Rfl:     glucosamine-chondroitin 500-400 mg tablet, Take 1 tablet by mouth 3 (three) times daily., Disp: , Rfl:     ibuprofen (ADVIL,MOTRIN) 400 MG tablet, Take 400 mg by mouth every 6 (six) hours as needed for Other., Disp: , Rfl:     magnesium 250 mg Tab, Take 1 tablet by mouth once daily., Disp: , Rfl:     melatonin (MELATIN) 5 mg, Take 1 tablet by mouth nightly as needed., Disp: , Rfl:     melatonin (MELATIN), Take by mouth every evening., Disp: , Rfl:     sildenafiL (VIAGRA) 50 MG tablet, Take 50 mg by mouth daily as needed for Erectile Dysfunction., Disp: , Rfl:     · Health Risk Assessment   Fall Risk: no   Advance Directive:  Does not have an advanced directive. Verbal education and written education included in today's AVS.   Depression: PHQ9; 3    HTN:  yes, DASH diet, exercise, weight management, med compliance, home BP monitoring, and follow-up discussed.   T2DM:  yes, diabetic diet, glucose monitoring, activity level, weight management, med compliance, and follow-up discussed.   Tobacco use: former smoker, quit 1984  STI: NA    Alcohol misuse: occasional use   Statin Use: yes      · Health Risk Assessment  What [age] 70-79  Are you male or female?: Male  During the past four weeks, how much have you been bothered by emotional problems such as feeling anxious, depressed, irritable, sad, or downhearted and blue?: Not at all  During the past five weeks, has your physical and/or emotional health limited your social activities with family, friends, neighbors, or groups?: Not at all  During the past four weeks, how much bodily pain have you generally had?: Mild pain  During the past four weeks, was someone available to help if you needed and wanted help?: Yes, as much as I wanted  During the past four weeks, what was the hardest physical activity you could do for at least two minutes?: Heavy  Can you get to places out of walking distance  without help?  (For example, can you travel alone on buses or taxis, or drive your own car?): Yes  Can you go shopping for groceries or clothes without someone's help?: Yes  Can you prepare your own meals?: Yes  Can you do your own housework without help?: Yes  Because of any health problems, do you need the help of another person with your personal care needs such as eating, bathing, dressing, or getting around the house?: No  Can you handle your own money without help?: Yes  During the past four weeks, how would you rate your health in general?: Very good  How have things been going for you during the past four weeks?: Very well  Are you having difficulties driving your car?: No  Do you always fasten your seat belt when you are in a car?: Yes, usually  How often in the past four weeks have you been bothered by falling or dizzy when standing up?: Never  How often in the past four weeks have you been bothered by sexual problems?: Never  How often in the past four weeks have you been bothered by trouble eating well?: Never  How often in the past four weeks have you been bothered by teeth or denture problems?: Never  How often in the past four weeks have you been bothered with problems using the telephone?: Never  How often in the past four weeks have you been bothered by tiredness or fatigue?: Never  Have you fallen two or more times in the past year?: No  Are you afraid of falling?: No  Are you a smoker?: No  During the past four weeks, how many drinks of wine, beer, or other alcoholic beverages did you have?: No alcohol at all  Do you exercise for about 20 minutes three or more days a week?: Yes, most of the time  Have you been given any information to help you with hazards in your house that might hurt you?: Yes  Have you been given any information to help you with keeping track of your medications?: Yes  How often do you have trouble taking medicines the way you've been told to take them?: I always take them as  prescribed  How confident are you that you can control and manage most of your health problems?: Very confident  What is your race? (Check all that apply.):     · Health Maintenance   Last eye exam:    Last CV screen with lipids: 21   Diabetes screening with fasting glucose or A1c: 21   Colorectal cancer screen: 18   Flu Vaccine: declined   Pneumonia vaccines: 10/15/19-13 3/11/14-   COVID vaccine: declined   Hep B vaccine: NA   DEXA: NA   Last pap/pelvic: NA   Last Mammogram: NA   Last PSA screen: 21   AAA screenin/3/22 (once in lifetime for males 65-75 who have smoked > 100 cigarettes in lifetime)  HIV Screeing: NA  Hepatitis C Screen: declined  Low Dose CT Scan: NA    Health Maintenance Topics with due status: Not Due       Topic Last Completion Date    Colorectal Cancer Screening 2018    PROSTATE-SPECIFIC ANTIGEN 2021    Foot Exam 2021    Lipid Panel 2021    Hemoglobin A1c 2021     Health Maintenance Due   Topic Date Due    Eye Exam  Never done        Incontinence  Bowel: no  Bladder: no    Lab results available in Epic or see dates from [facility] above:   Lab Results   Component Value Date    CHOL 237 (H) 2021    CHOL 145 10/15/2020     Lab Results   Component Value Date    HDL 34 (L) 2021    HDL 41 10/15/2020     Lab Results   Component Value Date    LDLCALC  2021      Comment:      Unable to calculate due to one of the following values:  Cholesterol <5  HDL Cholesterol <5  Triglycerides <10 or >400    LDLCALC 69 10/15/2020     Lab Results   Component Value Date    TRIG 415 (H) 2021    TRIG 173 10/15/2020     Lab Results   Component Value Date    CHOLHDL 7.0 2021    CHOLHDL 3.5 10/15/2020       Lab Results   Component Value Date    HGBA1C 5.8 2021       Sodium   Date Value Ref Range Status   2021 139 136 - 145 mmol/L Final     Potassium   Date Value Ref Range Status   2021 4.6 3.5 - 5.1 mmol/L Final      Chloride   Date Value Ref Range Status   12/08/2021 104 98 - 107 mmol/L Final     CO2   Date Value Ref Range Status   12/08/2021 32 21 - 32 mmol/L Final     Glucose   Date Value Ref Range Status   12/08/2021 127 (H) 74 - 106 mg/dL Final     BUN   Date Value Ref Range Status   12/08/2021 16 7 - 18 mg/dL Final     Creatinine   Date Value Ref Range Status   12/08/2021 1.40 (H) 0.70 - 1.30 mg/dL Final     Calcium   Date Value Ref Range Status   12/08/2021 9.2 8.5 - 10.1 mg/dL Final     Total Protein   Date Value Ref Range Status   12/08/2021 8.2 6.4 - 8.2 g/dL Final     Albumin   Date Value Ref Range Status   12/08/2021 4.2 3.5 - 5.0 g/dL Final     Bilirubin, Total   Date Value Ref Range Status   12/08/2021 0.4 0.0 - 1.2 mg/dL Final     Alk Phos   Date Value Ref Range Status   12/08/2021 52 45 - 115 U/L Final     AST   Date Value Ref Range Status   12/08/2021 28 15 - 37 U/L Final     ALT   Date Value Ref Range Status   12/08/2021 69 (H) 16 - 61 U/L Final     Anion Gap   Date Value Ref Range Status   12/08/2021 8 7 - 16 mmol/L Final     eGFR    Date Value Ref Range Status   10/15/2020 70       Comment:     The above 20 analytes were performed by [facility] [id number] [place] [address]     eGFR   Date Value Ref Range Status   12/08/2021 53 (L) >=60 mL/min/1.73m² Final         Lab Results   Component Value Date    PSA <0.010 12/08/2021            · Care Team   PCP: Dr. Shabazz    Eye specialist: Dr. Mcfadden  Urology: Dr. Ireland       **See Completed Assessments for Annual Wellness visit within the encounter summary    The following assessments were completed & reviewed:  · Depression Screening  · Cognitive function Screening  · Timed Get Up Test  · Whisper Test  · Vision Screen  · Health Risk Assessment  · Checklist of ADLs and IADLs      Objective  Vitals:    01/10/22 1506   BP: 132/70   Pulse: 73   Resp: 18   Temp: 98 °F (36.7 °C)   TempSrc: Oral   SpO2: 98%   Weight: 97.9 kg (215 lb  "12.8 oz)   Height: 5' 11" (1.803 m)   PainSc: 0-No pain      Body mass index is 30.1 kg/m².  Ideal body weight: 75.3 kg (166 lb 0.1 oz)   Encouraged diabetic diet and exercise. Education given.     Physical Exam      Assessment:     There are no diagnoses linked to this encounter.       Plan:  Declines FLU/PNA  Declines statin  Continuing KETO diet and intermittent fasting.   Declines HEP C    Discussed and provided with a screening schedule and personal prevention plan in accordance with USPSTF age appropriate recommendations and Medicare screening guidelines.   Education, counseling, and referrals were provided as needed.  After Visit Summary printed and given to patient which includes written education and a list of any referrals if indicated.     F/u plan for yearly AWV.      "

## 2022-01-18 ENCOUNTER — PATIENT OUTREACH (OUTPATIENT)
Dept: FAMILY MEDICINE | Facility: CLINIC | Age: 72
End: 2022-01-18
Payer: MEDICARE

## 2022-03-08 ENCOUNTER — OFFICE VISIT (OUTPATIENT)
Dept: FAMILY MEDICINE | Facility: CLINIC | Age: 72
End: 2022-03-08
Payer: MEDICARE

## 2022-03-08 VITALS
WEIGHT: 211.81 LBS | OXYGEN SATURATION: 99 % | DIASTOLIC BLOOD PRESSURE: 80 MMHG | BODY MASS INDEX: 28.69 KG/M2 | RESPIRATION RATE: 16 BRPM | TEMPERATURE: 98 F | HEART RATE: 61 BPM | HEIGHT: 72 IN | SYSTOLIC BLOOD PRESSURE: 152 MMHG

## 2022-03-08 DIAGNOSIS — I10 HYPERTENSION, UNSPECIFIED TYPE: ICD-10-CM

## 2022-03-08 DIAGNOSIS — I25.10 MULTIPLE VESSEL CORONARY ARTERY DISEASE: ICD-10-CM

## 2022-03-08 DIAGNOSIS — E78.5 HYPERLIPIDEMIA, UNSPECIFIED HYPERLIPIDEMIA TYPE: Primary | ICD-10-CM

## 2022-03-08 DIAGNOSIS — E11.9 TYPE 2 DIABETES MELLITUS WITHOUT COMPLICATION, WITHOUT LONG-TERM CURRENT USE OF INSULIN: ICD-10-CM

## 2022-03-08 PROCEDURE — 99213 OFFICE O/P EST LOW 20 MIN: CPT | Mod: ,,, | Performed by: FAMILY MEDICINE

## 2022-03-08 PROCEDURE — 99213 PR OFFICE/OUTPT VISIT, EST, LEVL III, 20-29 MIN: ICD-10-PCS | Mod: ,,, | Performed by: FAMILY MEDICINE

## 2022-03-08 RX ORDER — LISINOPRIL 10 MG/1
15 TABLET ORAL DAILY
Qty: 135 TABLET | Refills: 1 | Status: SHIPPED | OUTPATIENT
Start: 2022-03-08 | End: 2022-06-08

## 2022-03-08 RX ORDER — MULTIVIT WITH MINERALS/HERBS
1 TABLET ORAL DAILY
COMMUNITY

## 2022-03-08 NOTE — PROGRESS NOTES
Kojo Shabazz DO   [facility]  [address]  [place]     PATIENT NAME: Darrell Urbina  : 1950  DATE: 3/8/22  MRN: [id number]      Billing Provider: Kojo Shabazz DO  Level of Service:   Patient PCP Information     Provider PCP Type    Kojo Shabazz DO General          Reason for Visit / Chief Complaint: Diabetes (3mo eval and lab follow up), Hypertension (3mo eval), and Hyperlipidemia (3mo eval)       Update PCP  Update Chief Complaint         History of Present Illness / Problem Focused Workflow     Darrell Urbina presents to the clinic for FU on hyperlipidemia and HTN. Reports history of CABG x2 . Has DC'd statin and is trying keto and intermittent fasting to reduced ASCVD.       Review of Systems     Review of Systems   Constitutional: Negative for activity change and unexpected weight change.   HENT: Negative for hearing loss, rhinorrhea and trouble swallowing.    Eyes: Negative for discharge and visual disturbance.   Respiratory: Negative for chest tightness and wheezing.    Cardiovascular: Negative for chest pain and palpitations.   Gastrointestinal: Negative for blood in stool, constipation, diarrhea and vomiting.   Endocrine: Negative for polydipsia and polyuria.   Genitourinary: Negative for difficulty urinating, hematuria and urgency.   Musculoskeletal: Negative for arthralgias, joint swelling and neck pain.   Neurological: Negative for weakness and headaches.   Psychiatric/Behavioral: Negative for confusion and dysphoric mood.   All other systems reviewed and are negative.       Medical / Social / Family History     Past Medical History:   Diagnosis Date    Diabetes mellitus, type 2     Elevated PSA     Hyperlipidemia     Hypertension     Hypomagnesemia     Multiple vessel coronary artery disease     OA (osteoarthritis)     Prostate cancer        Past Surgical History:   Procedure Laterality Date    APPENDECTOMY      COLONOSCOPY  2018    CORONARY  ARTERY BYPASS GRAFT      double bypass surgery      FINGER SURGERY Left     FRACTURE SURGERY      right foot    PROSTATE SURGERY      right knee surgery         Social History    reports that he quit smoking about 38 years ago. His smoking use included cigarettes. He has a 20.00 pack-year smoking history. He has never used smokeless tobacco. He reports current alcohol use. He reports that he does not use drugs.    Family History  's family history includes Hearing loss in his father; Heart disease in his father; Hyperlipidemia in his father; Hypertension in his father and mother; Lupus in his daughter; Migraines in his daughter; Osteoarthritis in his father; Sudden death in his father.    Medications and Allergies     Medications  Outpatient Medications Marked as Taking for the 3/8/22 encounter (Office Visit) with Kojo Shabazz, DO   Medication Sig Dispense Refill    ascorbic acid, vitamin C, (VITAMIN C) 1000 MG tablet Take 1,000 mg by mouth 2 (two) times daily.      aspirin (ECOTRIN) 81 MG EC tablet Take 81 mg by mouth once daily.      b complex vitamins tablet Take 1 tablet by mouth once daily.      blood-glucose meter Misc 1 Units by Misc.(Non-Drug; Combo Route) route once daily. Checking about 3x weekly      cinnamon bark 500 mg capsule Take 2 capsules by mouth once daily.      coenzyme Q10 100 mg capsule Take 1 capsule by mouth once daily.      glucosamine HCl 1,500 mg Tab Take 1 tablet by mouth every other day.      ibuprofen (ADVIL,MOTRIN) 400 MG tablet Take 400 mg by mouth every 6 (six) hours as needed for Other.      ivermectin (STROMECTOL) 3 mg Tab Take 22.75 mg by mouth once a week.      magnesium 250 mg Tab Take 1 tablet by mouth once daily.      melatonin (MELATIN) 5 mg Take 1 tablet by mouth nightly as needed.      metFORMIN (GLUCOPHAGE) 500 MG tablet Take 1 tablet (500 mg total) by mouth once daily. 90 tablet 1    multivitamin (THERAGRAN) per tablet Take 1 tablet by mouth  once daily.      NIFEdipine (ADALAT CC) 30 MG TbSR Take 1 tablet (30 mg total) by mouth once daily. 90 tablet 1    omega-3 fatty acids/fish oil (FISH OIL-OMEGA-3 FATTY ACIDS) 300-1,000 mg capsule Take by mouth once daily.      potassium 99 mg Tab Take 1 tablet by mouth 2 (two) times a day.      vitamin D (VITAMIN D3) 1000 units Tab Take 1,000 Units by mouth once daily.      zinc gluconate 10 mg Lozg Take 1 tablet by mouth once daily.      [DISCONTINUED] lisinopriL 10 MG tablet Take 1 tablet (10 mg total) by mouth once daily. 90 tablet 1       Allergies  Review of patient's allergies indicates:   Allergen Reactions    Hydrocodone      Other reaction(s): itching       Physical Examination     Vitals:    03/08/22 0941 03/08/22 1117   BP: (!) 140/80 (!) 152/80   BP Location: Left arm Left arm   Patient Position: Sitting Sitting   BP Method:  Large (Manual)   Pulse: 61    Resp: 16    Temp: 98.2 °F (36.8 °C)    SpO2: 99%    Weight: 96.1 kg (211 lb 12.8 oz)    Height: 6' (1.829 m)       Physical Exam  Vitals and nursing note reviewed.   Constitutional:       General: He is not in acute distress.     Appearance: Normal appearance. He is normal weight. He is not ill-appearing, toxic-appearing or diaphoretic.   Neck:      Vascular: No carotid bruit.   Cardiovascular:      Rate and Rhythm: Normal rate and regular rhythm.      Pulses: Normal pulses.      Heart sounds: Normal heart sounds.   Pulmonary:      Effort: Pulmonary effort is normal.      Breath sounds: Normal breath sounds.   Lymphadenopathy:      Cervical: No cervical adenopathy.   Neurological:      Mental Status: He is alert.          Assessment and Plan (including Health Maintenance)      Problem List  Smart Sets  Document Outside HM   :    Plan:     Upon review of lipid panel patient's calculated ASCVD is 49.8%. discussed with pt that we could reduce that risk to 14% with Statin or other medication like repatha. Pt declined both. Discussed with pt that I  would recommend starting a cholesterol-lowering medication of some kind given his risk being essentially 50% over the next 10 years.  Additionally given his history of CABG he likely has atherosclerosis and non coronary arteries.  Patient again declined after making informed decision.  Blood pressure remains elevated will increase lisinopril.  Recommend increasing lisinopril from 10 to20 mg per day; patient stated he would prefer increasing to 15 mg per day.  Advised patient that this would mean that he would have to take a no other pill every day as 15 is not a standard dose of lisinopril.  Patient verbalized understanding and agreement.  Will increase lisinopril to 15 mg p.o. q.day with understanding that at 1 month blood pressure recheck blood pressure is not within goal will increase to 20 mg.  Return to clinic 1 month for blood pressure recheck otherwise 3 months for follow-up on hyperlipidemia and diabetes.    There are no preventive care reminders to display for this patient.    Problem List Items Addressed This Visit        Cardiac/Vascular    Hypertension    Relevant Medications    lisinopriL 10 MG tablet        Hypertension, unspecified type  -     lisinopriL 10 MG tablet; Take 1.5 tablets (15 mg total) by mouth once daily.  Dispense: 135 tablet; Refill: 1       Health Maintenance Topics with due status: Not Due       Topic Last Completion Date    Colorectal Cancer Screening 02/05/2018    Eye Exam 05/20/2021    PROSTATE-SPECIFIC ANTIGEN 12/08/2021    High Dose Statin 12/08/2021    Foot Exam 12/08/2021    Hemoglobin A1c 12/08/2021    Lipid Panel 03/07/2022       Procedures     Future Appointments   Date Time Provider Department Center   1/11/2023  2:30 PM AWV NURSE, [facility] FAMILY [facility]ICINE [facility]        No follow-ups on file.       Signature:  Kojo Shabazz DO    Date of encounter: 3/8/22    Education Documentation  No documentation found.  Education Comments  No comments  found.       There are no Patient Instructions on file for this visit.

## 2022-03-10 ENCOUNTER — TELEPHONE (OUTPATIENT)
Dept: FAMILY MEDICINE | Facility: CLINIC | Age: 72
End: 2022-03-10
Payer: MEDICARE

## 2022-03-10 ENCOUNTER — PATIENT MESSAGE (OUTPATIENT)
Dept: FAMILY MEDICINE | Facility: CLINIC | Age: 72
End: 2022-03-10
Payer: MEDICARE

## 2022-03-10 NOTE — TELEPHONE ENCOUNTER
Pt sent message that he was taking a 5 mg of Lisinopril and he did not realize that he was halfing a 10 mg tablet so request for his Lisinopril not be increased. Updated med list to reflect the Lisinopril 10 mg daily.

## 2022-03-11 DIAGNOSIS — Z71.89 COMPLEX CARE COORDINATION: ICD-10-CM

## 2022-04-05 DIAGNOSIS — E11.9 TYPE 2 DIABETES MELLITUS WITHOUT COMPLICATION, WITHOUT LONG-TERM CURRENT USE OF INSULIN: ICD-10-CM

## 2022-04-21 ENCOUNTER — PATIENT MESSAGE (OUTPATIENT)
Dept: FAMILY MEDICINE | Facility: CLINIC | Age: 72
End: 2022-04-21
Payer: MEDICARE

## 2022-04-22 ENCOUNTER — CLINICAL SUPPORT (OUTPATIENT)
Dept: FAMILY MEDICINE | Facility: CLINIC | Age: 72
End: 2022-04-22
Payer: MEDICARE

## 2022-04-22 VITALS — SYSTOLIC BLOOD PRESSURE: 138 MMHG | DIASTOLIC BLOOD PRESSURE: 70 MMHG

## 2022-04-22 DIAGNOSIS — I10 PRIMARY HYPERTENSION: Primary | ICD-10-CM

## 2022-04-22 NOTE — PROGRESS NOTES
Pt rtc for BP check.   BP - 138/70.  Pt states he is taking Lisinopril 10 mg daily.  Pt states he was halving the tablet thinking he had 20 mg tablets but realized he was halving a 10 mg tablet therefore was only taking 5 mg of Lisinopril daily. Pt states he corrected that about 4 wks ago and is now taking Lisinopril 10 mg daily.  Instructed  Pt to continue taking medication as directed  And rtc in one month for f/u htn  per v/o Dr. Shabazz.  Pt voiced understanding.

## 2022-05-13 RX ORDER — METFORMIN HYDROCHLORIDE 500 MG/1
TABLET ORAL
Qty: 90 TABLET | Refills: 1 | Status: SHIPPED | OUTPATIENT
Start: 2022-05-13 | End: 2022-12-27 | Stop reason: SDUPTHER

## 2022-06-08 ENCOUNTER — OFFICE VISIT (OUTPATIENT)
Dept: FAMILY MEDICINE | Facility: CLINIC | Age: 72
End: 2022-06-08
Payer: MEDICARE

## 2022-06-08 VITALS
BODY MASS INDEX: 28.31 KG/M2 | SYSTOLIC BLOOD PRESSURE: 144 MMHG | TEMPERATURE: 98 F | DIASTOLIC BLOOD PRESSURE: 77 MMHG | HEIGHT: 72 IN | OXYGEN SATURATION: 97 % | HEART RATE: 69 BPM | WEIGHT: 209 LBS | RESPIRATION RATE: 16 BRPM

## 2022-06-08 DIAGNOSIS — E11.9 TYPE 2 DIABETES MELLITUS WITHOUT COMPLICATION, WITHOUT LONG-TERM CURRENT USE OF INSULIN: ICD-10-CM

## 2022-06-08 DIAGNOSIS — Z12.5 ENCOUNTER FOR SCREENING FOR MALIGNANT NEOPLASM OF PROSTATE: Primary | ICD-10-CM

## 2022-06-08 DIAGNOSIS — I10 PRIMARY HYPERTENSION: ICD-10-CM

## 2022-06-08 PROCEDURE — 99214 PR OFFICE/OUTPT VISIT, EST, LEVL IV, 30-39 MIN: ICD-10-PCS | Mod: ,,, | Performed by: FAMILY MEDICINE

## 2022-06-08 PROCEDURE — 99214 OFFICE O/P EST MOD 30 MIN: CPT | Mod: ,,, | Performed by: FAMILY MEDICINE

## 2022-06-08 RX ORDER — LISINOPRIL 10 MG/1
10 TABLET ORAL DAILY
Qty: 90 TABLET | Refills: 3 | Status: SHIPPED | OUTPATIENT
Start: 2022-06-08 | End: 2023-06-08

## 2022-06-08 RX ORDER — LISINOPRIL 5 MG/1
5 TABLET ORAL DAILY
Qty: 90 TABLET | Refills: 3 | Status: SHIPPED | OUTPATIENT
Start: 2022-06-08 | End: 2024-01-22

## 2022-06-08 NOTE — PROGRESS NOTES
yvonne Shabazz DO   [facility]  [address]  [place]     PATIENT NAME: Darrell Urbina  : 1950  DATE: 22  MRN: [id number]      Billing Provider: Kojo Shabazz DO  Level of Service:   Patient PCP Information     Provider PCP Type    Kojo Shabazz DO General          Reason for Visit / Chief Complaint: Follow-up (3 mo follow up)       Update PCP  Update Chief Complaint         History of Present Illness / Problem Focused Workflow     Darrell Urbina presents to the clinic for DM2 FU. Reports non compliance with diet. Reports compliance with medication. No other complaints.       Review of Systems     Review of Systems   Constitutional: Negative.    Respiratory: Negative.  Negative for shortness of breath.    Cardiovascular: Negative for chest pain and palpitations.   Gastrointestinal: Negative.    Musculoskeletal: Negative for neck pain.   Neurological: Negative for headaches.   All other systems reviewed and are negative.       Medical / Social / Family History     Past Medical History:   Diagnosis Date    Diabetes mellitus, type 2     Elevated PSA     Hyperlipidemia     Hypertension     Hypomagnesemia     Multiple vessel coronary artery disease     OA (osteoarthritis)     Prostate cancer        Past Surgical History:   Procedure Laterality Date    APPENDECTOMY      COLONOSCOPY  2018    CORONARY ARTERY BYPASS GRAFT      double bypass surgery      FINGER SURGERY Left     FRACTURE SURGERY      right foot    PROSTATE SURGERY      right knee surgery         Social History    reports that he quit smoking about 38 years ago. His smoking use included cigarettes. He has a 20.00 pack-year smoking history. He has never used smokeless tobacco. He reports current alcohol use. He reports that he does not use drugs.    Family History  's family history includes Hearing loss in his father; Heart disease in his father; Hyperlipidemia in his father;  Hypertension in his father and mother; Lupus in his daughter; Migraines in his daughter; Osteoarthritis in his father; Sudden death in his father.    Medications and Allergies     Medications  Outpatient Medications Marked as Taking for the 6/8/22 encounter (Office Visit) with Kojo Shabazz, DO   Medication Sig Dispense Refill    ascorbic acid, vitamin C, (VITAMIN C) 1000 MG tablet Take 1,000 mg by mouth 2 (two) times daily.      aspirin (ECOTRIN) 81 MG EC tablet Take 81 mg by mouth once daily.      b complex vitamins tablet Take 1 tablet by mouth once daily.      blood-glucose meter Misc 1 Units by Misc.(Non-Drug; Combo Route) route once daily. Checking about 3x weekly      cinnamon bark 500 mg capsule Take 2 capsules by mouth once daily.      coenzyme Q10 100 mg capsule Take 1 capsule by mouth once daily.      cyanocobalamin 500 MCG tablet Take 1 tablet by mouth once daily.      glucosamine HCl 1,500 mg Tab Take 1 tablet by mouth every other day.      glucosamine-chondroitin 500-400 mg tablet Take 1 tablet by mouth 3 (three) times daily.      ibuprofen (ADVIL,MOTRIN) 400 MG tablet Take 400 mg by mouth every 6 (six) hours as needed for Other.      ivermectin (STROMECTOL) 3 mg Tab Take 22.75 mg by mouth once a week.      multivit-min/folic acid/vit K1 (MULTIVIT-MIN-FOLIC ACID-VIT K ORAL) Take by mouth.      multivitamin (THERAGRAN) per tablet Take 1 tablet by mouth once daily.      NIFEdipine (ADALAT CC) 30 MG TbSR Take 1 tablet (30 mg total) by mouth once daily. 90 tablet 1    omega-3 fatty acids/fish oil (FISH OIL-OMEGA-3 FATTY ACIDS) 300-1,000 mg capsule Take by mouth once daily.      potassium 99 mg Tab Take 1 tablet by mouth 2 (two) times a day.      vitamin D (VITAMIN D3) 1000 units Tab Take 1,000 Units by mouth once daily.      zinc gluconate 10 mg Lozg Take 1 tablet by mouth once daily.         Allergies  Review of patient's allergies indicates:   Allergen Reactions    Hydrocodone       Other reaction(s): itching       Physical Examination     Vitals:    06/08/22 1020   BP: (!) 144/77   BP Location: Left arm   Patient Position: Sitting   Pulse: 69   Resp: 16   Temp: 98.3 °F (36.8 °C)   TempSrc: Oral   SpO2: 97%   Weight: 94.8 kg (209 lb)   Height: 6' (1.829 m)      Physical Exam  Vitals and nursing note reviewed.   Constitutional:       General: He is not in acute distress.     Appearance: Normal appearance. He is normal weight. He is not ill-appearing, toxic-appearing or diaphoretic.   Cardiovascular:      Rate and Rhythm: Normal rate and regular rhythm.      Pulses: Normal pulses.      Heart sounds: Normal heart sounds.   Pulmonary:      Effort: Pulmonary effort is normal.      Breath sounds: Normal breath sounds.   Neurological:      Mental Status: He is alert.          Assessment and Plan (including Health Maintenance)      Problem List  Smart Sets  Document Outside HM   :    Plan:   Pt again declined statin. Discussed risk of CAD and recommendation for statin with DM2.   Recommended increasing lisinopril to 20mg, pt would prefer 15mg, will write for 10mg and 5mg tablets  Standing orders for future labs CBC, BMP, A1c, Lipid panel      Health Maintenance Due   Topic Date Due    Hepatitis C Screening  Never done    High Dose Statin  Never done    Eye Exam  05/20/2022    Hemoglobin A1c  06/08/2022       Problem List Items Addressed This Visit        Cardiac/Vascular    Hypertension    Relevant Medications    lisinopriL 10 MG tablet    lisinopriL (PRINIVIL,ZESTRIL) 5 MG tablet    Other Relevant Orders    Basic Metabolic Panel    CBC Auto Differential    Lipid Panel       Renal/    Encounter for screening for malignant neoplasm of prostate - Primary       Endocrine    Diabetes mellitus, type 2    Relevant Orders    Basic Metabolic Panel    CBC Auto Differential    Lipid Panel    Hemoglobin A1C        Encounter for screening for malignant neoplasm of prostate    Type 2 diabetes mellitus  without complication, without long-term current use of insulin  -     Basic Metabolic Panel; Future; Expected date: 06/09/2022  -     CBC Auto Differential; Future; Expected date: 06/09/2022  -     Lipid Panel; Future; Expected date: 06/09/2022  -     Hemoglobin A1C; Future; Expected date: 06/09/2022    Primary hypertension  -     Basic Metabolic Panel; Future; Expected date: 06/09/2022  -     CBC Auto Differential; Future; Expected date: 06/09/2022  -     Lipid Panel; Future; Expected date: 06/09/2022  -     lisinopriL 10 MG tablet; Take 1 tablet (10 mg total) by mouth once daily.  Dispense: 90 tablet; Refill: 3  -     lisinopriL (PRINIVIL,ZESTRIL) 5 MG tablet; Take 1 tablet (5 mg total) by mouth once daily.  Dispense: 90 tablet; Refill: 3       Health Maintenance Topics with due status: Not Due       Topic Last Completion Date    Colorectal Cancer Screening 02/05/2018    PROSTATE-SPECIFIC ANTIGEN 12/08/2021    Foot Exam 12/08/2021    Lipid Panel 03/07/2022    Aspirin/Antiplatelet Therapy 03/08/2022    Influenza Vaccine Not Due       Procedures     Future Appointments   Date Time Provider Department Center   1/11/2023  2:30 PM AWV NURSE, [facility]IC[facility]        No follow-ups on file.       Signature:  Kojo Shabazz DO    Date of encounter: 6/8/22    Education Documentation  No documentation found.  Education Comments  No comments found.       There are no Patient Instructions on file for this visit.   Answers for HPI/ROS submitted by the patient on 6/6/2022  Chronicity: recurrent  Onset: more than 1 year ago  Progression since onset: unchanged  Condition status: controlled  anxiety: No  blurred vision: No  malaise/fatigue: No  orthopnea: No  peripheral edema: No  PND: No  sweats: No  CAD risks: diabetes mellitus, dyslipidemia, family history, obesity, stress  Compliance problems: no compliance problems

## 2022-09-14 DIAGNOSIS — E11.9 TYPE 2 DIABETES MELLITUS WITHOUT COMPLICATION, WITHOUT LONG-TERM CURRENT USE OF INSULIN: ICD-10-CM

## 2022-10-09 DIAGNOSIS — Z71.89 COMPLEX CARE COORDINATION: ICD-10-CM

## 2022-12-27 ENCOUNTER — PATIENT MESSAGE (OUTPATIENT)
Dept: FAMILY MEDICINE | Facility: CLINIC | Age: 72
End: 2022-12-27
Payer: MEDICARE

## 2022-12-27 DIAGNOSIS — E11.9 TYPE 2 DIABETES MELLITUS WITHOUT COMPLICATION, WITHOUT LONG-TERM CURRENT USE OF INSULIN: ICD-10-CM

## 2022-12-27 RX ORDER — METFORMIN HYDROCHLORIDE 500 MG/1
TABLET ORAL
Qty: 90 TABLET | Refills: 1 | OUTPATIENT
Start: 2022-12-27

## 2022-12-27 RX ORDER — METFORMIN HYDROCHLORIDE 500 MG/1
500 TABLET ORAL DAILY
Qty: 30 TABLET | Refills: 0 | Status: SHIPPED | OUTPATIENT
Start: 2022-12-27 | End: 2023-01-11 | Stop reason: SDUPTHER

## 2022-12-27 NOTE — TELEPHONE ENCOUNTER
Patient is needing a refill on Metformin until he can establish care with Dr Cartagena after Dr Shabazz left, requesting 30 day supply until his appointment with Dr Cartagena on 1/23. She is off until 1/3

## 2023-01-11 ENCOUNTER — OFFICE VISIT (OUTPATIENT)
Dept: FAMILY MEDICINE | Facility: CLINIC | Age: 73
End: 2023-01-11
Payer: MEDICARE

## 2023-01-11 VITALS
RESPIRATION RATE: 20 BRPM | SYSTOLIC BLOOD PRESSURE: 136 MMHG | BODY MASS INDEX: 30.08 KG/M2 | HEART RATE: 61 BPM | TEMPERATURE: 97 F | HEIGHT: 72 IN | OXYGEN SATURATION: 99 % | DIASTOLIC BLOOD PRESSURE: 72 MMHG | WEIGHT: 222.06 LBS

## 2023-01-11 DIAGNOSIS — Z12.11 ENCOUNTER FOR SCREENING FOR COLORECTAL MALIGNANT NEOPLASM: ICD-10-CM

## 2023-01-11 DIAGNOSIS — Z00.00 ENCOUNTER FOR SUBSEQUENT ANNUAL WELLNESS VISIT (AWV) IN MEDICARE PATIENT: Primary | ICD-10-CM

## 2023-01-11 DIAGNOSIS — E11.9 TYPE 2 DIABETES MELLITUS WITHOUT COMPLICATION, WITHOUT LONG-TERM CURRENT USE OF INSULIN: ICD-10-CM

## 2023-01-11 DIAGNOSIS — Z12.12 ENCOUNTER FOR SCREENING FOR COLORECTAL MALIGNANT NEOPLASM: ICD-10-CM

## 2023-01-11 PROCEDURE — G0439 PR MEDICARE ANNUAL WELLNESS SUBSEQUENT VISIT: ICD-10-PCS | Mod: ,,, | Performed by: FAMILY MEDICINE

## 2023-01-11 PROCEDURE — G0439 PPPS, SUBSEQ VISIT: HCPCS | Mod: ,,, | Performed by: FAMILY MEDICINE

## 2023-01-11 RX ORDER — METFORMIN HYDROCHLORIDE 500 MG/1
500 TABLET ORAL DAILY
Qty: 30 TABLET | Refills: 0 | Status: SHIPPED | OUTPATIENT
Start: 2023-01-11 | End: 2023-01-19 | Stop reason: SDUPTHER

## 2023-01-11 NOTE — PROGRESS NOTES
[facility]     PATIENT NAME: Darrell Urbina   : 1950    AGE: 72 y.o. DATE: 2023   MRN: [id number]        Reason for Visit / Chief Complaint: Medicare AWV (Medicare SUBS AWV )        Darrell Urbina presents for a SUBSEQUENT Medicare AWV today.     The following components were reviewed and updated:    Medical/Social/Family History:  Past Medical History:   Diagnosis Date    Diabetes mellitus, type 2     Elevated PSA     Hyperlipidemia     Hypertension     Hypomagnesemia     Multiple vessel coronary artery disease     OA (osteoarthritis)     Prostate cancer         Family History   Problem Relation Age of Onset    Heart disease Father     Hearing loss Father     Hyperlipidemia Father     Hypertension Father     Osteoarthritis Father     Sudden death Father     Migraines Daughter     Lupus Daughter     Hypertension Mother         Social History     Tobacco Use   Smoking Status Former    Packs/day: 1.00    Years: 20.00    Pack years: 20.00    Types: Cigarettes    Quit date:     Years since quittin.0   Smokeless Tobacco Never      Social History     Substance and Sexual Activity   Alcohol Use Yes    Comment: occ       Family History   Problem Relation Age of Onset    Heart disease Father     Hearing loss Father     Hyperlipidemia Father     Hypertension Father     Osteoarthritis Father     Sudden death Father     Migraines Daughter     Lupus Daughter     Hypertension Mother        Past Surgical History:   Procedure Laterality Date    APPENDECTOMY      COLONOSCOPY  2018    CORONARY ARTERY BYPASS GRAFT      double bypass surgery      FINGER SURGERY Left     FRACTURE SURGERY      right foot    PROSTATE SURGERY      right knee surgery           Allergies and Current Medications     Review of patient's allergies indicates:   Allergen Reactions    Hydrocodone      Other reaction(s): itching       Current Outpatient Medications:     ascorbic acid,  vitamin C, (VITAMIN C) 1000 MG tablet, Take 1,000 mg by mouth 2 (two) times daily., Disp: , Rfl:     aspirin (ECOTRIN) 81 MG EC tablet, Take 81 mg by mouth once daily., Disp: , Rfl:     b complex vitamins tablet, Take 1 tablet by mouth once daily., Disp: , Rfl:     blood-glucose meter Misc, 1 Units by Misc.(Non-Drug; Combo Route) route once daily. Checking about 3x weekly, Disp: , Rfl:     cinnamon bark 500 mg capsule, Take 2 capsules by mouth once daily., Disp: , Rfl:     coenzyme Q10 100 mg capsule, Take 1 capsule by mouth once daily., Disp: , Rfl:     cyanocobalamin 500 MCG tablet, Take 1 tablet by mouth once daily., Disp: , Rfl:     glucosamine HCl 1,500 mg Tab, Take 1 tablet by mouth every other day., Disp: , Rfl:     glucosamine-chondroitin 500-400 mg tablet, Take 1 tablet by mouth 3 (three) times daily., Disp: , Rfl:     ibuprofen (ADVIL,MOTRIN) 400 MG tablet, Take 400 mg by mouth every 6 (six) hours as needed for Other., Disp: , Rfl:     ivermectin (STROMECTOL) 3 mg Tab, Take 22.75 mg by mouth once a week., Disp: , Rfl:     lisinopriL (PRINIVIL,ZESTRIL) 5 MG tablet, Take 1 tablet (5 mg total) by mouth once daily., Disp: 90 tablet, Rfl: 3    lisinopriL 10 MG tablet, Take 1 tablet (10 mg total) by mouth once daily., Disp: 90 tablet, Rfl: 3    magnesium 250 mg Tab, Take 1 tablet by mouth once daily., Disp: , Rfl:     multivit-min/folic acid/vit K1 (MULTIVIT-MIN-FOLIC ACID-VIT K ORAL), Take by mouth., Disp: , Rfl:     multivitamin (THERAGRAN) per tablet, Take 1 tablet by mouth once daily., Disp: , Rfl:     NIFEdipine (ADALAT CC) 30 MG TbSR, Take 1 tablet (30 mg total) by mouth once daily., Disp: 90 tablet, Rfl: 1    omega-3 fatty acids/fish oil (FISH OIL-OMEGA-3 FATTY ACIDS) 300-1,000 mg capsule, Take by mouth once daily., Disp: , Rfl:     potassium 99 mg Tab, Take 1 tablet by mouth 2 (two) times a day., Disp: , Rfl:     vitamin D (VITAMIN D3) 1000 units Tab, Take 1,000 Units by mouth once daily., Disp: , Rfl:      zinc gluconate 10 mg Lozg, Take 1 tablet by mouth once daily., Disp: , Rfl:     chromium-brindal berry (GARCINIA CAMBOGIA) 200-500 mcg-mg Tab, Take 1 tablet by mouth once daily., Disp: , Rfl:     fish,bora,flax oils-om3,6,9no1 400-400-400 mg Cap, Take 1 capsule by mouth every other day. 3,5,6,7,9, Disp: , Rfl:     melatonin (MELATIN) 5 mg, Take 1 tablet by mouth nightly as needed., Disp: , Rfl:     melatonin (MELATIN), Take by mouth every evening., Disp: , Rfl:     metFORMIN (GLUCOPHAGE) 500 MG tablet, Take 1 tablet (500 mg total) by mouth once daily., Disp: 30 tablet, Rfl: 0    sildenafiL (VIAGRA) 50 MG tablet, Take 50 mg by mouth daily as needed for Erectile Dysfunction., Disp: , Rfl:     Health Risk Assessment   Fall Risk: No        Advance Directive:  Does not have an advanced directive. Verbal education and written education included in today's AVS.   Depression: PHQ9 1    HTN: yes, DASH diet, exercise, weight management, med compliance, home BP monitoring, and follow-up discussed.   T2DM: yes, diabetic diet, glucose monitoring, activity level, weight management, med compliance, and follow-up discussed.   Tobacco use: Former Smoker. Quit 1984  STI: NA    Alcohol misuse: Pt reports occasional use.   Statin Use: No      Health Risk Assessment  What [age] your age[age] 70-79  Are you male or female?: Male  During the past four weeks, how much have you been bothered by emotional problems such as feeling anxious, depressed, irritable, sad, or downhearted and blue?: Moderately  During the past five weeks, has your physical and/or emotional health limited your social activities with family, friends, neighbors, or groups?: Not at all  During the past four weeks, how much bodily pain have you generally had?: Moderate pain  During the past four weeks, was someone available to help if you needed and wanted help?: Yes, as much as I wanted  During the past four weeks, what was the hardest physical activity you could do for at  least two minutes?: Heavy  Can you get to places out of walking distance without help?  (For example, can you travel alone on buses or taxis, or drive your own car?): Yes  Can you go shopping for groceries or clothes without someone's help?: Yes  Can you prepare your own meals?: Yes  Can you do your own housework without help?: Yes  Because of any health problems, do you need the help of another person with your personal care needs such as eating, bathing, dressing, or getting around the house?: No  Can you handle your own money without help?: Yes  During the past four weeks, how would you rate your health in general?: Good  How have things been going for you during the past four weeks?: Pretty well  Are you having difficulties driving your car?: No  Do you always fasten your seat belt when you are in a car?: Yes, usually  How often in the past four weeks have you been bothered by falling or dizzy when standing up?: Never  How often in the past four weeks have you been bothered by sexual problems?: Never  How often in the past four weeks have you been bothered by trouble eating well?: Never  How often in the past four weeks have you been bothered by teeth or denture problems?: Never  How often in the past four weeks have you been bothered with problems using the telephone?: Never  How often in the past four weeks have you been bothered by tiredness or fatigue?: Sometimes  Have you fallen two or more times in the past year?: No  Are you afraid of falling?: No  Are you a smoker?: No  During the past four weeks, how many drinks of wine, beer, or other alcoholic beverages did you have?: One drink or less per week  Do you exercise for about 20 minutes three or more days a week?: Yes, most of the time  Have you been given any information to help you with hazards in your house that might hurt you?: No  Have you been given any information to help you with keeping track of your medications?: No  How often do you have trouble  taking medicines the way you've been told to take them?: I always take them as prescribed  How confident are you that you can control and manage most of your health problems?: Very confident  What is your race? (Check all that apply.):     Health Maintenance   Last eye exam: 2022 will request records   Last CV screen with lipids: 01/11/2023   Diabetes screening with fasting glucose or A1c: 01/11/2023   Colonoscopy: 02/05/2018 repeat on 5 years   Flu Vaccine: Declined   Pneumonia vaccines: 10/15/2019-13 03/11/2014-23   COVID vaccine: Declined   Hep B vaccine: NA   DEXA: NA   Last pap/pelvic: NA   Last Mammogram: NA   Last PSA screen: 01/11/202   AAA screening: NA (once in lifetime for males 65-75 who have smoked > 100 cigarettes in lifetime)  HIV Screeing: NA  Hepatitis C Screen: 01/11/2023  Low Dose CT Scan: NA  BMI:30.11    Health Maintenance Topics with due status: Not Due       Topic Last Completion Date    Lipid Panel 06/09/2022    Aspirin/Antiplatelet Therapy 01/11/2023     Health Maintenance Due   Topic Date Due    Hepatitis C Screening  Never done    High Dose Statin  Never done    Diabetes Urine Screening  04/20/2022    Eye Exam  05/20/2022    PROSTATE-SPECIFIC ANTIGEN  12/08/2022    Foot Exam  12/08/2022    Hemoglobin A1c  12/09/2022    Colorectal Cancer Screening  02/05/2023       Incontinence  Bowel: No  Bladder: Yes    Lab results available in Epic or see dates from [facility] above:   Lab Results   Component Value Date    CHOL 238 (H) 06/09/2022    CHOL 187 03/07/2022    CHOL 237 (H) 12/08/2021     Lab Results   Component Value Date    HDL 34 (L) 06/09/2022    HDL 35 (L) 03/07/2022    HDL 34 (L) 12/08/2021     Lab Results   Component Value Date    LDLCALC 135 06/09/2022    LDLCALC 110 03/07/2022    LDLCALC  12/08/2021      Comment:      Unable to calculate due to one of the following values:  Cholesterol <5  HDL Cholesterol <5  Triglycerides <10 or >400     Lab Results   Component Value Date    TRIG  345 (H) 06/09/2022    TRIG 212 (H) 03/07/2022    TRIG 415 (H) 12/08/2021     Lab Results   Component Value Date    CHOLHDL 7.0 06/09/2022    CHOLHDL 5.3 03/07/2022    CHOLHDL 7.0 12/08/2021       Lab Results   Component Value Date    HGBA1C 5.5 06/09/2022       Sodium   Date Value Ref Range Status   06/09/2022 139 136 - 145 mmol/L Final     Potassium   Date Value Ref Range Status   06/09/2022 4.8 3.5 - 5.1 mmol/L Final     Chloride   Date Value Ref Range Status   06/09/2022 106 98 - 107 mmol/L Final     CO2   Date Value Ref Range Status   06/09/2022 30 21 - 32 mmol/L Final     Glucose   Date Value Ref Range Status   06/09/2022 107 (H) 74 - 106 mg/dL Final     BUN   Date Value Ref Range Status   06/09/2022 19 (H) 7 - 18 mg/dL Final     Creatinine   Date Value Ref Range Status   06/09/2022 1.40 (H) 0.70 - 1.30 mg/dL Final     Calcium   Date Value Ref Range Status   06/09/2022 9.2 8.5 - 10.1 mg/dL Final     Total Protein   Date Value Ref Range Status   12/08/2021 8.2 6.4 - 8.2 g/dL Final     Albumin   Date Value Ref Range Status   12/08/2021 4.2 3.5 - 5.0 g/dL Final     Bilirubin, Total   Date Value Ref Range Status   12/08/2021 0.4 0.0 - 1.2 mg/dL Final     Alk Phos   Date Value Ref Range Status   12/08/2021 52 45 - 115 U/L Final     AST   Date Value Ref Range Status   12/08/2021 28 15 - 37 U/L Final     ALT   Date Value Ref Range Status   12/08/2021 69 (H) 16 - 61 U/L Final     Anion Gap   Date Value Ref Range Status   06/09/2022 8 7 - 16 mmol/L Final     eGFR    Date Value Ref Range Status   10/15/2020 70       Comment:     The above 20 analytes were performed by [id number] [place] [address],[place] [id number]     eGFR   Date Value Ref Range Status   06/09/2022 53 (L) >=60 mL/min/1.73m² Final         Lab Results   Component Value Date    PSA <0.010 12/08/2021    (Delete this line if female pt)        Care Team   PCP: Savi Cartagena    Eye specialist: Dr. Lincoln  Urology: Dr. Cortes  Beka   GI: Dr. Dumont              Cardiology: Dr Jose Velazco         **See Completed Assessments for Annual Wellness visit within the encounter summary    The following assessments were completed & reviewed:  Depression Screening  Cognitive function Screening  Timed Get Up Test  Whisper Test  Vision Screen  Health Risk Assessment  Checklist of ADLs and IADLs      Objective  Vitals:    01/11/23 1436   BP: 136/72   Pulse: 61   Resp: 20   Temp: 97.4 °F (36.3 °C)   SpO2: 99%   Weight: 100.7 kg (222 lb 0.6 oz)   Height: 6' (1.829 m)   PainSc: 0-No pain      Body mass index is 30.11 kg/m².  Ideal body weight: 77.6 kg (171 lb 1.2 oz)       Physical Exam      Assessment:     1. Encounter for subsequent annual wellness visit (AWV) in Medicare patient    2. BMI 30.0-30.9,adult    3. Type 2 diabetes mellitus without complication, without long-term current use of insulin  - metFORMIN (GLUCOPHAGE) 500 MG tablet; Take 1 tablet (500 mg total) by mouth once daily.  Dispense: 30 tablet; Refill: 0    4. Encounter for screening for colorectal malignant neoplasm  - Ambulatory referral/consult to Gastroenterology; Future       Problem List Items Addressed This Visit          Endocrine    Diabetes mellitus, type 2    Relevant Medications    metFORMIN (GLUCOPHAGE) 500 MG tablet     Other Visit Diagnoses       Encounter for subsequent annual wellness visit (AWV) in Medicare patient    -  Primary    BMI 30.0-30.9,adult        Encounter for screening for colorectal malignant neoplasm        Relevant Orders    Ambulatory referral/consult to Gastroenterology              Plan:    Referrals:   Colonoscopy    Advised to call office if does not hear from anyone with referral appt within 2-3 weeks to check on status of referral. Voiced understanding.        Discussed and provided with a screening schedule and personal prevention plan in accordance with USPSTF age appropriate recommendations and Medicare screening guidelines.   Education  given and reviewed with patient. Counseling and referrals were provided as needed.  After Visit Summary printed and given to patient which includes written education and a list of any referrals if indicated.     F/u plan for yearly AWV.    Signature:   Savi Cartagena MD

## 2023-01-11 NOTE — PATIENT INSTRUCTIONS
Counseling and Referral of Other Preventative  (Italic type indicates deductible and co-insurance are waived)    Patient Name: Darrell Urbina  Today's Date: 1/11/2023    Health Maintenance         Date Due Completion Date    Hepatitis C Screening Never done ---    TETANUS VACCINE Never done ---    High Dose Statin Never done ---    Shingles Vaccine (1 of 2) 01/22/2014 11/27/2013    Pneumococcal Vaccines (Age 65+) (3 - PPSV23 if available, else PCV20) 10/15/2020 10/15/2019    Diabetes Urine Screening 04/20/2022 4/20/2021    Eye Exam 05/20/2022 5/20/2021    PROSTATE-SPECIFIC ANTIGEN 12/08/2022 12/8/2021    Foot Exam 12/08/2022 12/8/2021    Override on 12/8/2021: Done    Hemoglobin A1c 12/09/2022 6/9/2022    Colorectal Cancer Screening 02/05/2023 2/5/2018    COVID-19 Vaccine (1) 01/11/2024 (Originally 1950) ---    Aspirin/Antiplatelet Therapy 06/08/2023 6/8/2022    Lipid Panel 06/09/2023 6/9/2022          No orders of the defined types were placed in this encounter.

## 2023-01-19 DIAGNOSIS — E11.9 TYPE 2 DIABETES MELLITUS WITHOUT COMPLICATION, WITHOUT LONG-TERM CURRENT USE OF INSULIN: ICD-10-CM

## 2023-01-19 RX ORDER — METFORMIN HYDROCHLORIDE 500 MG/1
500 TABLET ORAL DAILY
Qty: 90 TABLET | Refills: 1 | Status: SHIPPED | OUTPATIENT
Start: 2023-01-19

## 2023-01-19 NOTE — TELEPHONE ENCOUNTER
Mario Alberto from [facility] called and he only had a thirty day supply with no refills on his Metformin.

## 2023-05-09 DIAGNOSIS — Z71.89 COMPLEX CARE COORDINATION: ICD-10-CM

## 2023-05-22 DIAGNOSIS — I10 PRIMARY HYPERTENSION: ICD-10-CM

## 2023-10-16 LAB
LEFT EYE DM RETINOPATHY: NEGATIVE
RIGHT EYE DM RETINOPATHY: NEGATIVE

## 2024-01-09 DIAGNOSIS — Z71.89 COMPLEX CARE COORDINATION: ICD-10-CM

## 2024-01-19 NOTE — PROGRESS NOTES
Darrell Urbina presented for a  Medicare AWV and comprehensive Health Risk Assessment today. The following components were reviewed and updated:    Medical history  Family History  Social history  Allergies and Current Medications  Health Risk Assessment  Health Maintenance  Care Team         ** See Completed Assessments for Annual Wellness Visit within the encounter summary.**         The following assessments were completed:  Living Situation  CAGE  Depression Screening  Timed Get Up and Go  Whisper Test  Cognitive Function Screening  Nutrition Screening  ADL Screening  PAQ Screening        Vitals:    01/22/24 1504   BP: 138/80   BP Location: Left arm   Patient Position: Sitting   Pulse: 77   Resp: 20   Temp: 97.4 °F (36.3 °C)   SpO2: 96%   Weight: 102.1 kg (225 lb)   Height: 6' (1.829 m)     Body mass index is 30.52 kg/m².  Physical Exam  Constitutional:       Appearance: Normal appearance.   HENT:      Head: Normocephalic.      Nose: Nose normal.   Eyes:      Extraocular Movements: Extraocular movements intact.      Pupils: Pupils are equal, round, and reactive to light.   Cardiovascular:      Rate and Rhythm: Normal rate and regular rhythm.      Pulses: Normal pulses.           Dorsalis pedis pulses are 2+ on the right side and 2+ on the left side.        Posterior tibial pulses are 2+ on the right side and 2+ on the left side.      Heart sounds: Normal heart sounds.   Pulmonary:      Effort: Pulmonary effort is normal.      Breath sounds: Normal breath sounds.   Musculoskeletal:         General: Normal range of motion.      Cervical back: Normal range of motion.      Right foot: Normal range of motion. No deformity, bunion, Charcot foot, foot drop or prominent metatarsal heads.      Left foot: Normal range of motion. No deformity, bunion, Charcot foot, foot drop or prominent metatarsal heads.        Feet:    Feet:      Right foot:      Protective Sensation: 10 sites tested.  10 sites sensed.      Skin  integrity: Callus and dry skin present.      Toenail Condition: Fungal disease present.     Left foot:      Protective Sensation: 10 sites tested.  10 sites sensed.      Skin integrity: Callus and dry skin present.      Toenail Condition: Fungal disease present.     Comments: X= Intact protective sensation  0= callus    Skin:     General: Skin is warm and dry.      Capillary Refill: Capillary refill takes less than 2 seconds.   Neurological:      General: No focal deficit present.      Mental Status: He is alert and oriented to person, place, and time.   Psychiatric:         Mood and Affect: Mood normal.         Behavior: Behavior normal.               Diagnoses and health risks identified today and associated recommendations/orders:    Problem List Items Addressed This Visit       BMI 30.0-30.9,adult     Followed by PCP.          Diabetes mellitus, type 2     Diabetes is controlled. Current medical regimen is effective;   Will adjust according to results and monitor.          Relevant Orders    Hemoglobin A1C    Microalbumin/Creatinine Ratio, Urine    Encounter for subsequent annual wellness visit (AWV) in Medicare patient - Primary     Attend regularly scheduled office visits.  Monitor/keep log of BP outside of clinic.   Schedule colonoscopy as needed.  Take medications as prescribed.   Avoid adding table salt to foods.   Recommend regular physical activity 30 min most days of the week.           Hypertension     Blood pressure is controlled. Current medical regimen is effective;   Will adjust according to results and monitor.          Relevant Orders    Lipid Panel    Multiple vessel coronary artery disease     Followed by Cardiology.             Provided Darrell with a 5-10 year written screening schedule and personal prevention plan. Recommendations were developed using the USPSTF age appropriate recommendations. Education, counseling, and referrals were provided as needed. After Visit Summary printed and given  to patient which includes a list of additional screenings\tests needed.    Follow up for yearly annual wellness visit    Declined all vaccines    I offered to discuss advanced care planning, including how to pick a person who would make decisions for you if you were unable to make them for yourself, called a health care power of , and what kind of decisions you might make such as use of life sustaining treatments such as ventilators and tube feeding when faced with a life limiting illness recorded on a living will that they will need to know. (How you want to be cared for as you near the end of your natural life)     X Patient is interested in learning more about how to make advanced directives.  I provided them paperwork and offered to discuss this with them.

## 2024-01-22 ENCOUNTER — OFFICE VISIT (OUTPATIENT)
Dept: FAMILY MEDICINE | Facility: CLINIC | Age: 74
End: 2024-01-22
Payer: MEDICARE

## 2024-01-22 VITALS
HEART RATE: 77 BPM | DIASTOLIC BLOOD PRESSURE: 80 MMHG | TEMPERATURE: 97 F | BODY MASS INDEX: 30.48 KG/M2 | WEIGHT: 225 LBS | HEIGHT: 72 IN | RESPIRATION RATE: 20 BRPM | SYSTOLIC BLOOD PRESSURE: 138 MMHG | OXYGEN SATURATION: 96 %

## 2024-01-22 DIAGNOSIS — I25.10 MULTIPLE VESSEL CORONARY ARTERY DISEASE: ICD-10-CM

## 2024-01-22 DIAGNOSIS — E11.9 TYPE 2 DIABETES MELLITUS WITHOUT COMPLICATION, WITHOUT LONG-TERM CURRENT USE OF INSULIN: ICD-10-CM

## 2024-01-22 DIAGNOSIS — Z00.00 ENCOUNTER FOR SUBSEQUENT ANNUAL WELLNESS VISIT (AWV) IN MEDICARE PATIENT: Primary | ICD-10-CM

## 2024-01-22 DIAGNOSIS — E11.9 TYPE 2 DIABETES MELLITUS WITHOUT COMPLICATION, WITHOUT LONG-TERM CURRENT USE OF INSULIN: Primary | ICD-10-CM

## 2024-01-22 DIAGNOSIS — I10 PRIMARY HYPERTENSION: ICD-10-CM

## 2024-01-22 PROCEDURE — G0439 PPPS, SUBSEQ VISIT: HCPCS | Mod: ,,, | Performed by: NURSE PRACTITIONER

## 2024-01-22 NOTE — LETTER
AUTHORIZATION FOR RELEASE OF   CONFIDENTIAL INFORMATION    Dear Randys,    We are seeing Darrell Urbina, date of birth 1950, in the clinic at [facility]. Zuleima Weber FNP is the patient's PCP. Darrell Urbina has an outstanding lab/procedure at the time we reviewed his chart. In order to help keep his health information updated, he has authorized us to request the following medical record(s):        (  )  MAMMOGRAM                                      ( X )  COLONOSCOPY      (  )  PAP SMEAR                                          (  )  OUTSIDE LAB RESULTS     (  )  DEXA SCAN                                          (  )  EYE EXAM            (  )  FOOT EXAM                                          (  )  ENTIRE RECORD     (  )  OUTSIDE IMMUNIZATIONS                 (  )  _______________         Please fax records to Zuleima Weber FNP, [phone]     If you have any questions, please contact office at [phone].           Patient Name: Darrell Urbina  : 1950  Patient Phone #: [phone]

## 2024-01-22 NOTE — PATIENT INSTRUCTIONS
Counseling and Referral of Other Preventative  (Italic type indicates deductible and co-insurance are waived)    Patient Name: Darrell Urbina  Today's Date: 1/22/2024    Health Maintenance       Date Due Completion Date    COVID-19 Vaccine (1) Never done ---    TETANUS VACCINE Never done ---    High Dose Statin Never done ---    RSV Vaccine (Age 60+ and Pregnant patients) (1 - 1-dose 60+ series) Never done ---    Shingles Vaccine (1 of 2) 01/22/2014 11/27/2013    Pneumococcal Vaccines (Age 65+) (3 - PPSV23 or PCV20) 01/06/2021 1/6/2020    Eye Exam 05/20/2022 5/20/2021    Foot Exam 12/08/2022 12/8/2021    Override on 12/8/2021: Done    Colorectal Cancer Screening 02/05/2023 2/5/2018    Hemoglobin A1c 07/11/2023 1/11/2023    Lipid Panel 01/11/2024 1/11/2023    Diabetes Urine Screening 01/11/2024 1/11/2023    PROSTATE-SPECIFIC ANTIGEN 08/29/2024 8/29/2023        No orders of the defined types were placed in this encounter.      Counseling and Referral of Other Preventative  (Italic type indicates deductible and co-insurance are waived)    Patient Name: Darrell Urbina  Today's Date: 1/22/2024    Health Maintenance       Date Due Completion Date    COVID-19 Vaccine (1) Never done ---    TETANUS VACCINE Never done ---    High Dose Statin Never done ---    RSV Vaccine (Age 60+ and Pregnant patients) (1 - 1-dose 60+ series) Never done ---    Shingles Vaccine (1 of 2) 01/22/2014 11/27/2013    Pneumococcal Vaccines (Age 65+) (3 - PPSV23 or PCV20) 01/06/2021 1/6/2020    Eye Exam 05/20/2022 5/20/2021    Foot Exam 12/08/2022 12/8/2021    Override on 12/8/2021: Done    Colorectal Cancer Screening 02/05/2023 2/5/2018    Hemoglobin A1c 07/11/2023 1/11/2023    Lipid Panel 01/11/2024 1/11/2023    Diabetes Urine Screening 01/11/2024 1/11/2023    PROSTATE-SPECIFIC ANTIGEN 08/29/2024 8/29/2023        No orders of the defined types were placed in this encounter.      The following information is provided to all patients.  This information  is to help you find resources for any of the problems found today that may be affecting your health:                  Living healthy guide: [place].gov    Understanding Diabetes: www.diabetes.org      Eating healthy: www.cdc.gov/healthyweight      CDC home safety checklist: www.cdc.gov/steadi/patient.html      Agency on Aging: [place].gov    Alcoholics anonymous (AA): www.aa.org      Physical Activity: www.josh.nih.gov/ao5lttd      Tobacco use: [place].gov

## 2024-01-23 ENCOUNTER — LAB VISIT (OUTPATIENT)
Dept: LAB | Facility: CLINIC | Age: 74
End: 2024-01-23
Payer: MEDICARE

## 2024-01-23 DIAGNOSIS — I10 PRIMARY HYPERTENSION: ICD-10-CM

## 2024-01-23 DIAGNOSIS — E11.9 TYPE 2 DIABETES MELLITUS WITHOUT COMPLICATION, WITHOUT LONG-TERM CURRENT USE OF INSULIN: ICD-10-CM

## 2024-01-23 LAB
CHOLEST SERPL-MCNC: 258 MG/DL (ref 0–200)
CHOLEST/HDLC SERPL: 7.4 {RATIO}
CREAT UR-MCNC: 127 MG/DL (ref 39–259)
EST. AVERAGE GLUCOSE BLD GHB EST-MCNC: 134 MG/DL
HBA1C MFR BLD HPLC: 6.3 % (ref 4.5–6.6)
HDLC SERPL-MCNC: 35 MG/DL (ref 40–60)
LDLC SERPL CALC-MCNC: 160 MG/DL
LDLC/HDLC SERPL: 4.6 {RATIO}
MICROALBUMIN UR-MCNC: 4.6 MG/DL (ref 0–2.8)
MICROALBUMIN/CREAT RATIO PNL UR: 36.2 MG/G (ref 0–30)
NONHDLC SERPL-MCNC: 223 MG/DL
TRIGL SERPL-MCNC: 317 MG/DL (ref 35–150)
VLDLC SERPL-MCNC: 63 MG/DL

## 2024-01-23 PROCEDURE — 82570 ASSAY OF URINE CREATININE: CPT | Mod: ,,, | Performed by: CLINICAL MEDICAL LABORATORY

## 2024-01-23 PROCEDURE — 82043 UR ALBUMIN QUANTITATIVE: CPT | Mod: ,,, | Performed by: CLINICAL MEDICAL LABORATORY

## 2024-01-23 PROCEDURE — 80053 COMPREHEN METABOLIC PANEL: CPT | Mod: ,,, | Performed by: CLINICAL MEDICAL LABORATORY

## 2024-01-23 PROCEDURE — 83036 HEMOGLOBIN GLYCOSYLATED A1C: CPT | Mod: ,,, | Performed by: CLINICAL MEDICAL LABORATORY

## 2024-01-23 PROCEDURE — 80061 LIPID PANEL: CPT | Mod: ,,, | Performed by: CLINICAL MEDICAL LABORATORY

## 2024-01-24 ENCOUNTER — PATIENT OUTREACH (OUTPATIENT)
Dept: ADMINISTRATIVE | Facility: HOSPITAL | Age: 74
End: 2024-01-24

## 2024-01-24 LAB
ALBUMIN SERPL BCP-MCNC: 4.2 G/DL (ref 3.5–5)
ALBUMIN/GLOB SERPL: 1.2 {RATIO}
ALP SERPL-CCNC: 44 U/L (ref 45–115)
ALT SERPL W P-5'-P-CCNC: 134 U/L (ref 16–61)
ANION GAP SERPL CALCULATED.3IONS-SCNC: 12 MMOL/L (ref 7–16)
AST SERPL W P-5'-P-CCNC: 64 U/L (ref 15–37)
BILIRUB SERPL-MCNC: 0.5 MG/DL (ref ?–1.2)
BUN SERPL-MCNC: 19 MG/DL (ref 7–18)
BUN/CREAT SERPL: 12 (ref 6–20)
CALCIUM SERPL-MCNC: 9.8 MG/DL (ref 8.5–10.1)
CHLORIDE SERPL-SCNC: 105 MMOL/L (ref 98–107)
CO2 SERPL-SCNC: 26 MMOL/L (ref 21–32)
CREAT SERPL-MCNC: 1.6 MG/DL (ref 0.7–1.3)
EGFR (NO RACE VARIABLE) (RUSH/TITUS): 45 ML/MIN/1.73M2
GLOBULIN SER-MCNC: 3.6 G/DL (ref 2–4)
GLUCOSE SERPL-MCNC: 142 MG/DL (ref 74–106)
POTASSIUM SERPL-SCNC: 5.3 MMOL/L (ref 3.5–5.1)
PROT SERPL-MCNC: 7.8 G/DL (ref 6.4–8.2)
SODIUM SERPL-SCNC: 138 MMOL/L (ref 136–145)

## 2024-01-24 NOTE — PROGRESS NOTES
Health maintenance record review for population health care gaps  Population Health Chart Review & Patient Outreach Details      Further Action Needed If Patient Returns Outreach:            Updates Requested / Reviewed:     []  Care Everywhere    []     []  External Sources ([facility], [facility], [facility], etc.)    [] [facility]   [] [facility]   [] Other:    []  Care Team Updated   []  Removed  or Duplicate Orders   []  Immunization Reconciliation Completed / Queried    [] [place]   [] [place]   [] [place]   [] [place]      Health Maintenance Topics Addressed and Outreach Outcomes / Actions Taken:             Breast Cancer Screening []  Mammogram Order Placed    []  Mammogram Screening Scheduled    []  External Records Requested & Care Team Updated if Applicable    []  External Records Uploaded & Care Team Updated if Applicable    []  Pt Declined Scheduling Mammogram    []  Pt Will Schedule with External Provider / Order Routed & Care Team Updated if Applicable              Cervical Cancer Screening []  Pap Smear Scheduled in Primary Care or OBGYN    []  External Records Requested & Care Team Updated if Applicable       []  External Records Uploaded, Care Team Updated, & History Updated if Applicable    []  Patient Declined Scheduling Pap Smear    []  Patient Will Schedule with External Provider & Care Team Updated if Applicable                  Colorectal Cancer Screening []  Colonoscopy Case Request / Referral / Home Test Order Placed    [x]  External Records Requested & Care Team Updated if Applicable    []  External Records Uploaded, Care Team Updated, & History Updated if Applicable    []  Patient Declined Completing Colon Cancer Screening    []  Patient Will Schedule with External Provider & Care Team Updated if Applicable    []  Fit Kit Mailed (add the SmartPhrase under additional notes)    []  Reminded Patient to Complete Home Test                Diabetic Eye Exam []  Eye Exam Screening Order Placed     []  Eye Camera Scheduled or Optometry/Ophthalmology Referral Placed    [x]  External Records Requested & Care Team Updated if Applicable    []  External Records Uploaded, Care Team Updated, & History Updated if Applicable    []  Patient Declined Scheduling Eye Exam    []  Patient Will Schedule with External Provider & Care Team Updated if Applicable             Blood Pressure Control []  Primary Care Follow Up Visit Scheduled     []  Remote Blood Pressure Reading Captured    []  Patient Declined Remote Reading or Scheduling Appt - Escalated to PCP    []  Patient Will Call Back or Send Portal Message with Reading                 HbA1c & Other Labs []  Overdue Lab(s) Ordered    []  Overdue Lab(s) Scheduled    []  External Records Uploaded & Care Team Updated if Applicable    []  Primary Care Follow Up Visit Scheduled     []  Reminded Patient to Complete A1c Home Test    []  Patient Declined Scheduling Labs or Will Call Back to Schedule    []  Patient Will Schedule with External Provider / Order Routed, & Care Team Updated if Applicable           Primary Care Appointment []  Primary Care Appt Scheduled    []  Patient Declined Scheduling or Will Call Back to Schedule    []  Pt Established with External Provider, Updated Care Team, & Informed Pt to Notify Payor if Applicable           Medication Adherence /    Statin Use []  Primary Care Appointment Scheduled    []  Patient Reminded to  Prescription    []  Patient Declined, Provider Notified if Needed    []  Sent Provider Message to Review to Evaluate Pt for Statin, Add Exclusion Dx Codes, Document   Exclusion in Problem List, Change Statin Intensity Level to Moderate or High Intensity if Applicable                Osteoporosis Screening []  Dexa Order Placed    []  Dexa Appointment Scheduled    []  External Records Requested & Care Team Updated    []  External Records Uploaded, Care Team Updated, & History Updated if Applicable    []  Patient Declined  Scheduling Dexa or Will Call Back to Schedule    []  Patient Will Schedule with External Provider / Order Routed & Care Team Updated if Applicable       Additional Notes:

## 2024-01-24 NOTE — PROGRESS NOTES
"Please let pt know his triglycerides are elevated more than last check, his total cholesterol is elevated, his "good" cholesterol is low at 35, we like this to be above 40. I see he is on several supplements including fish oil but I do not see where he has taken a statin or if he is able to tolerate this? Just check to see if he is still taking fish oil, he can actually take 6 grams to his per day if he is not he can increase to this amount. Of course low fat, low cholesterol diet along with exercising at least 30 minutes per day (walking) for at least 5 days out of the week would be beneficial. I am adding a cmp to his lab that will check his sodium, potassuim, kidney function/liver function we should get back tomorrow as it was not ordered yesterday. Thanks!"

## 2024-01-24 NOTE — LETTER
AUTHORIZATION FOR RELEASE OF   CONFIDENTIAL INFORMATION    Dear [facility],    We are seeing Darrell Urbina, date of birth 1950, in the clinic at [facility]. Zuleima Weber FNP is the patient's PCP. Darrell Urbina has an outstanding lab/procedure at the time we reviewed his chart. In order to help keep his health information updated, he has authorized us to request the following medical record(s):        (  )  MAMMOGRAM                                      ( x )  COLONOSCOPY      (  )  PAP SMEAR                                          ( x )  OUTSIDE LAB RESULTS     (  )  DEXA SCAN                                          (  )  EYE EXAM            (  )  FOOT EXAM                                          (  )  ENTIRE RECORD     (  )  OUTSIDE IMMUNIZATIONS                 (  )  _______________         Please fax records to [facility], Cain, Sierra, FNP, [phone]     If you have any questions, please contact Doe Ventura at [phone].           Patient Name: Darrell Urbina  : 1950  Patient Phone #: [phone]

## 2024-01-24 NOTE — LETTER
AUTHORIZATION FOR RELEASE OF   CONFIDENTIAL INFORMATION    Dear Dr.Lee Lincoln,    We are seeing Darrell Urbina, date of birth 1950, in the clinic at [facility]. Zuleima Weber FNP is the patient's PCP. Darrell Urbina has an outstanding lab/procedure at the time we reviewed his chart. In order to help keep his health information updated, he has authorized us to request the following medical record(s):        (  )  MAMMOGRAM                                      (  )  COLONOSCOPY      (  )  PAP SMEAR                                          (  )  OUTSIDE LAB RESULTS     (  )  DEXA SCAN                                          ( x )  EYE EXAM            (  )  FOOT EXAM                                          (  )  ENTIRE RECORD     (  )  OUTSIDE IMMUNIZATIONS                 (  )  _______________         Please fax records to [facility], Cain, Sierra, FNP, [phone]     If you have any questions, please contact Doe Ventura at [phone].           Patient Name: Darrell Urbina  : 1950  Patient Phone #: [phone]

## 2024-01-25 NOTE — PROGRESS NOTES
Please let pt know his potassium is up slightly. If he is taking a potassium supplement otc, I would decrease this, (if he is doing twice daily, I would cut back to once daily, if he is doing daily I would cut back to every other day). His kidney function has decreased some also since last check; I feel like if we can get his cholesterol back down this will get better. I do not know if he takes anything with acetaminophen in it but he needs to try and cut back on this if he does and no alcohol. Regular exercise, fresh fruits/vegetables, low fat diet will also help. We can recheck fasting labs in approx 3 months. Thanks!

## 2024-02-21 ENCOUNTER — PATIENT MESSAGE (OUTPATIENT)
Dept: ADMINISTRATIVE | Facility: HOSPITAL | Age: 74
End: 2024-02-21

## 2024-02-22 ENCOUNTER — PATIENT OUTREACH (OUTPATIENT)
Dept: ADMINISTRATIVE | Facility: HOSPITAL | Age: 74
End: 2024-02-22

## 2024-02-22 NOTE — PROGRESS NOTES
Health maintenance record review for population health care gaps    Population Health Chart Review & Patient Outreach Details      Further Action Needed If Patient Returns Outreach:            Updates Requested / Reviewed:     []  Care Everywhere    []     []  External Sources ([facility], [facility], [facility], etc.)    [] [facility]   [] [facility]   [] Other:    []  Care Team Updated   []  Removed  or Duplicate Orders   []  Immunization Reconciliation Completed / Queried    [] [place]   [] [place]   [] [place]   [] [place]      Health Maintenance Topics Addressed and Outreach Outcomes / Actions Taken:             Breast Cancer Screening []  Mammogram Order Placed    []  Mammogram Screening Scheduled    []  External Records Requested & Care Team Updated if Applicable    []  External Records Uploaded & Care Team Updated if Applicable    []  Pt Declined Scheduling Mammogram    []  Pt Will Schedule with External Provider / Order Routed & Care Team Updated if Applicable              Cervical Cancer Screening []  Pap Smear Scheduled in Primary Care or OBGYN    []  External Records Requested & Care Team Updated if Applicable       []  External Records Uploaded, Care Team Updated, & History Updated if Applicable    []  Patient Declined Scheduling Pap Smear    []  Patient Will Schedule with External Provider & Care Team Updated if Applicable                  Colorectal Cancer Screening []  Colonoscopy Case Request / Referral / Home Test Order Placed    []  External Records Requested & Care Team Updated if Applicable    []  External Records Uploaded, Care Team Updated, & History Updated if Applicable    []  Patient Declined Completing Colon Cancer Screening    []  Patient Will Schedule with External Provider & Care Team Updated if Applicable    []  Fit Kit Mailed (add the SmartPhrase under additional notes)    []  Reminded Patient to Complete Home Test                Diabetic Eye Exam []  Eye Exam Screening Order Placed     []  Eye Camera Scheduled or Optometry/Ophthalmology Referral Placed    []  External Records Requested & Care Team Updated if Applicable    [x]  External Records Uploaded, Care Team Updated, & History Updated if Applicable    []  Patient Declined Scheduling Eye Exam    []  Patient Will Schedule with External Provider & Care Team Updated if Applicable             Blood Pressure Control []  Primary Care Follow Up Visit Scheduled     []  Remote Blood Pressure Reading Captured    []  Patient Declined Remote Reading or Scheduling Appt - Escalated to PCP    []  Patient Will Call Back or Send Portal Message with Reading                 HbA1c & Other Labs []  Overdue Lab(s) Ordered    []  Overdue Lab(s) Scheduled    []  External Records Uploaded & Care Team Updated if Applicable    []  Primary Care Follow Up Visit Scheduled     []  Reminded Patient to Complete A1c Home Test    []  Patient Declined Scheduling Labs or Will Call Back to Schedule    []  Patient Will Schedule with External Provider / Order Routed, & Care Team Updated if Applicable           Primary Care Appointment []  Primary Care Appt Scheduled    []  Patient Declined Scheduling or Will Call Back to Schedule    []  Pt Established with External Provider, Updated Care Team, & Informed Pt to Notify Payor if Applicable           Medication Adherence /    Statin Use []  Primary Care Appointment Scheduled    []  Patient Reminded to  Prescription    []  Patient Declined, Provider Notified if Needed    []  Sent Provider Message to Review to Evaluate Pt for Statin, Add Exclusion Dx Codes, Document   Exclusion in Problem List, Change Statin Intensity Level to Moderate or High Intensity if Applicable                Osteoporosis Screening []  Dexa Order Placed    []  Dexa Appointment Scheduled    []  External Records Requested & Care Team Updated    []  External Records Uploaded, Care Team Updated, & History Updated if Applicable    []  Patient Declined  Scheduling Dexa or Will Call Back to Schedule    []  Patient Will Schedule with External Provider / Order Routed & Care Team Updated if Applicable       Additional Notes:

## 2024-04-22 PROBLEM — Z00.00 ENCOUNTER FOR SUBSEQUENT ANNUAL WELLNESS VISIT (AWV) IN MEDICARE PATIENT: Status: RESOLVED | Noted: 2024-01-22 | Resolved: 2024-04-22

## 2024-05-07 ENCOUNTER — PATIENT MESSAGE (OUTPATIENT)
Dept: FAMILY MEDICINE | Facility: CLINIC | Age: 74
End: 2024-05-07
Payer: MEDICARE

## 2024-05-07 DIAGNOSIS — E11.9 TYPE 2 DIABETES MELLITUS WITHOUT COMPLICATION, WITHOUT LONG-TERM CURRENT USE OF INSULIN: ICD-10-CM

## 2024-05-07 RX ORDER — METFORMIN HYDROCHLORIDE 500 MG/1
500 TABLET ORAL DAILY
Qty: 90 TABLET | Refills: 1 | Status: SHIPPED | OUTPATIENT
Start: 2024-05-07

## 2024-05-07 RX ORDER — MILK THISTLE 150 MG
CAPSULE ORAL
COMMUNITY

## 2024-05-07 RX ORDER — ATORVASTATIN CALCIUM 10 MG/1
TABLET, FILM COATED ORAL
COMMUNITY

## 2024-07-18 PROCEDURE — 88341 IMHCHEM/IMCYTCHM EA ADD ANTB: CPT | Mod: 26,,, | Performed by: PATHOLOGY

## 2024-07-18 PROCEDURE — 88342 IMHCHEM/IMCYTCHM 1ST ANTB: CPT | Mod: 26,,, | Performed by: PATHOLOGY

## 2024-07-18 PROCEDURE — 88305 TISSUE EXAM BY PATHOLOGIST: CPT | Mod: 26,,, | Performed by: PATHOLOGY

## 2024-07-18 PROCEDURE — 88305 TISSUE EXAM BY PATHOLOGIST: CPT | Mod: TC,SUR | Performed by: DERMATOLOGY

## 2024-07-19 ENCOUNTER — LAB REQUISITION (OUTPATIENT)
Dept: LAB | Facility: HOSPITAL | Age: 74
End: 2024-07-19
Attending: DERMATOLOGY
Payer: MEDICARE

## 2024-07-19 DIAGNOSIS — D49.2 NEOPLASM OF UNSPECIFIED BEHAVIOR OF BONE, SOFT TISSUE, AND SKIN: ICD-10-CM

## 2024-07-22 LAB
DHEA SERPL-MCNC: NORMAL
ESTROGEN SERPL-MCNC: NORMAL PG/ML
INSULIN SERPL-ACNC: NORMAL U[IU]/ML
LAB AP GROSS DESCRIPTION: NORMAL
LAB AP LABORATORY NOTES: NORMAL
LAB AP SPEC A DDX: NORMAL
LAB AP SPEC A MORPHOLOGY: NORMAL
LAB AP SPEC A PROCEDURE: NORMAL
T3RU NFR SERPL: NORMAL %

## 2024-08-09 DIAGNOSIS — Z71.89 COMPLEX CARE COORDINATION: ICD-10-CM

## 2024-11-05 ENCOUNTER — OFFICE VISIT (OUTPATIENT)
Dept: FAMILY MEDICINE | Facility: CLINIC | Age: 74
End: 2024-11-05
Payer: MEDICARE

## 2024-11-05 VITALS
HEART RATE: 66 BPM | WEIGHT: 227 LBS | OXYGEN SATURATION: 97 % | SYSTOLIC BLOOD PRESSURE: 140 MMHG | RESPIRATION RATE: 20 BRPM | BODY MASS INDEX: 30.75 KG/M2 | DIASTOLIC BLOOD PRESSURE: 70 MMHG | TEMPERATURE: 98 F | HEIGHT: 72 IN

## 2024-11-05 DIAGNOSIS — C61 PROSTATE CANCER: ICD-10-CM

## 2024-11-05 DIAGNOSIS — N18.31 CHRONIC KIDNEY DISEASE, STAGE 3A: ICD-10-CM

## 2024-11-05 DIAGNOSIS — E78.2 MIXED HYPERLIPIDEMIA: ICD-10-CM

## 2024-11-05 DIAGNOSIS — E11.9 TYPE 2 DIABETES MELLITUS WITHOUT COMPLICATION, WITHOUT LONG-TERM CURRENT USE OF INSULIN: ICD-10-CM

## 2024-11-05 DIAGNOSIS — I10 PRIMARY HYPERTENSION: Primary | ICD-10-CM

## 2024-11-05 DIAGNOSIS — Z79.899 OTHER LONG TERM (CURRENT) DRUG THERAPY: ICD-10-CM

## 2024-11-05 PROCEDURE — 99214 OFFICE O/P EST MOD 30 MIN: CPT | Mod: ,,, | Performed by: NURSE PRACTITIONER

## 2024-11-05 RX ORDER — CHOLECALCIFEROL (VITAMIN D3) 25 MCG
1000 TABLET ORAL DAILY
Status: CANCELLED | OUTPATIENT
Start: 2024-11-05

## 2024-11-05 RX ORDER — LISINOPRIL 5 MG/1
TABLET ORAL
Qty: 90 TABLET | Refills: 3 | OUTPATIENT
Start: 2024-11-05

## 2024-11-05 RX ORDER — LISINOPRIL 5 MG/1
5 TABLET ORAL DAILY
Qty: 90 TABLET | Refills: 0 | Status: SHIPPED | OUTPATIENT
Start: 2024-11-05 | End: 2025-11-05

## 2024-11-05 RX ORDER — LISINOPRIL 10 MG/1
10 TABLET ORAL DAILY
Qty: 90 TABLET | Refills: 0 | Status: SHIPPED | OUTPATIENT
Start: 2024-11-05 | End: 2025-11-05

## 2024-11-05 RX ORDER — PERPHENAZINE 2 MG
1 TABLET ORAL
COMMUNITY

## 2024-11-05 RX ORDER — NIFEDIPINE 30 MG/1
30 TABLET, EXTENDED RELEASE ORAL DAILY
Qty: 90 TABLET | Refills: 0 | Status: SHIPPED | OUTPATIENT
Start: 2024-11-05

## 2024-11-05 RX ORDER — ATORVASTATIN CALCIUM 10 MG/1
TABLET, FILM COATED ORAL
Status: CANCELLED | OUTPATIENT
Start: 2024-11-05

## 2024-11-05 RX ORDER — SILDENAFIL 100 MG/1
50-100 TABLET, FILM COATED ORAL
COMMUNITY

## 2024-11-05 RX ORDER — TALC
250 POWDER (GRAM) TOPICAL
COMMUNITY

## 2024-11-05 RX ORDER — METFORMIN HYDROCHLORIDE 500 MG/1
500 TABLET ORAL DAILY
Qty: 90 TABLET | Refills: 0 | Status: SHIPPED | OUTPATIENT
Start: 2024-11-05

## 2024-11-05 NOTE — PROGRESS NOTES
"   CATIE Winter   [facility]  [address]  [place] [address]     PATIENT NAME: Darrell Urbina  : 1950  DATE: 24  MRN: [id number]      Billing Provider: CATIE Winter  Level of Service:   Patient PCP Information       Provider PCP Type    Primary Doctor No General            Reason for Visit / Chief Complaint: Establish Care (Pt here stating he doesn't know why he had to come back in he was just here in January and he felt like that should cover him for the year. I explained that usually NP see pt every 3-6 months for labs and refills. Pt was seen on an AWV in January. )   Health Maintenance Due   Topic Date Due    COVID-19 Vaccine (1) Never done    TETANUS VACCINE  Never done    RSV Vaccine (Age 60+ and Pregnant patients) (1 - Risk 60-74 years 1-dose series) Never done    Shingles Vaccine (1 of 2) 2014    Pneumococcal Vaccines (Age 65+) (3 of 3 - PPSV23 or PCV20) 2020    Hemoglobin A1c  2024    Eye Exam  10/16/2024          Update PCP  Update Chief Complaint         History of Present Illness / Problem Focused Workflow     Darrell Urbina presents to the clinic for med refills and check up. Pt states "[facility] just wanted him to come give them some money" so that is why he is here. Pt has not had labs since January of this year. He is not fasting. He states he is not going to take anything for his cholesterol so there really is no need in checking it. He has taken a "statin" medication in the past and could not tolerate them. I tried to tell him about other options other than the statin but he does not want to hear about them as he is not going to take anything for this. I told him about his labs from January as I'm not sure he got the message about them. His was potassium was 5.2 which is slightly elevated. He states he will just "cut back" on his potassium supplement. Pt takes several supplements otc. Informed his kidney function had decreased some and that is " "why we like to monitor labs at least every 6 months. Pt is not worried about this and states he drinks plenty of water. Informed about elevated liver enzymes, he is not worried about this either. I told him I was going to recheck his bp as it was slightly elevated. He states "I am an old man, my blood pressure is supposed to be high". He does see Dr. Velazco [facility] yearly and would like labs sent to their office. He also sees Dr. Ireland [facility] due to hx of prostate cancer yearly now as this has been 5 years ago. Pt declines flu shot and all other care gaps. He states he just needs his medications refilled. Informed he can just get labs at his visit in January and I will send in 3 month supply and when he comes in January I will send him in a year's supply of medications so he only has to come once a year. Pt vu. He denies any other needs at this time. NAD noted.      Hemoglobin A1C   Date Value Ref Range Status   01/23/2024 6.3 4.5 - 6.6 % Final     Comment:       Normal:               <5.7%  Pre-Diabetic:       5.7% to 6.4%  Diabetic:             >6.4%  Diabetic Goal:     <7%   01/11/2023 5.9 4.5 - 6.6 % Final     Comment:       Normal:               <5.7%  Pre-Diabetic:       5.7% to 6.4%  Diabetic:             >6.4%  Diabetic Goal:     <7%   06/09/2022 5.5 4.5 - 6.6 % Final     Comment:       Normal:               <5.7%  Pre-Diabetic:       5.7% to 6.4%  Diabetic:             >6.4%  Diabetic Goal:     <7%        CMP  Sodium   Date Value Ref Range Status   01/23/2024 138 136 - 145 mmol/L Final     Potassium   Date Value Ref Range Status   01/23/2024 5.3 (H) 3.5 - 5.1 mmol/L Final     Chloride   Date Value Ref Range Status   01/23/2024 105 98 - 107 mmol/L Final     CO2   Date Value Ref Range Status   01/23/2024 26 21 - 32 mmol/L Final     Glucose   Date Value Ref Range Status   01/23/2024 142 (H) 74 - 106 mg/dL Final     BUN   Date Value Ref Range Status   01/23/2024 19 (H) 7 - 18 mg/dL Final     Creatinine "   Date Value Ref Range Status   01/23/2024 1.60 (H) 0.70 - 1.30 mg/dL Final     Calcium   Date Value Ref Range Status   01/23/2024 9.8 8.5 - 10.1 mg/dL Final     Total Protein   Date Value Ref Range Status   01/23/2024 7.8 6.4 - 8.2 g/dL Final     Albumin   Date Value Ref Range Status   01/23/2024 4.2 3.5 - 5.0 g/dL Final     Bilirubin, Total   Date Value Ref Range Status   01/23/2024 0.5 >0.0 - 1.2 mg/dL Final     Alk Phos   Date Value Ref Range Status   01/23/2024 44 (L) 45 - 115 U/L Final     AST   Date Value Ref Range Status   01/23/2024 64 (H) 15 - 37 U/L Final     ALT   Date Value Ref Range Status   01/23/2024 134 (H) 16 - 61 U/L Final     Anion Gap   Date Value Ref Range Status   01/23/2024 12 7 - 16 mmol/L Final     eGFR   Date Value Ref Range Status   01/23/2024 45 (L) >=60 mL/min/1.73m2 Final        Lab Results   Component Value Date    WBC 5.42 06/09/2022    RBC 4.74 06/09/2022    HGB 15.9 06/09/2022    HCT 46.8 06/09/2022    MCV 98.7 (H) 06/09/2022    MCH 33.5 (H) 06/09/2022    MCHC 34.0 06/09/2022    RDW 12.1 06/09/2022     06/09/2022    MPV 10.3 06/09/2022    LYMPH 21.2 (L) 06/09/2022    LYMPH 1.15 06/09/2022    MONO 7.6 (H) 06/09/2022    EOS 0.13 06/09/2022    BASO 0.02 06/09/2022    EOSINOPHIL 2.4 06/09/2022    BASOPHIL 0.4 06/09/2022        Lab Results   Component Value Date    CHOL 258 (H) 01/23/2024    CHOL 195 01/11/2023    CHOL 238 (H) 06/09/2022     Lab Results   Component Value Date    HDL 35 (L) 01/23/2024    HDL 31 (L) 01/11/2023    HDL 34 (L) 06/09/2022     Lab Results   Component Value Date    LDLCALC 160 01/23/2024    LDLCALC 110 01/11/2023    LDLCALC 135 06/09/2022     Lab Results   Component Value Date    TRIG 317 (H) 01/23/2024    TRIG 270 (H) 01/11/2023    TRIG 345 (H) 06/09/2022     Lab Results   Component Value Date    CHOLHDL 7.4 01/23/2024    CHOLHDL 6.3 01/11/2023    CHOLHDL 7.0 06/09/2022        Wt Readings from Last 3 Encounters:   11/05/24 1454 103 kg (227 lb)    01/22/24 1504 102.1 kg (225 lb)   01/11/23 1436 100.7 kg (222 lb 0.6 oz)        BP Readings from Last 3 Encounters:   11/05/24 (!) 140/70   01/22/24 138/80   01/11/23 136/72        Review of Systems     Review of Systems   Constitutional: Negative.    Eyes: Negative.    Respiratory: Negative.     Cardiovascular: Negative.    Gastrointestinal: Negative.    Endocrine: Negative.    Genitourinary: Negative.    Musculoskeletal: Negative.    Integumentary:  Negative.   Allergic/Immunologic: Negative.    Neurological: Negative.    Hematological: Negative.    Psychiatric/Behavioral: Negative.          Medical / Social / Family History     Past Medical History:   Diagnosis Date    Diabetes mellitus, type 2     Elevated PSA     Hyperlipidemia     Hypertension     Hypomagnesemia     Multiple vessel coronary artery disease     OA (osteoarthritis)     Prostate cancer        Past Surgical History:   Procedure Laterality Date    APPENDECTOMY      COLONOSCOPY  02/05/2018    CORONARY ARTERY BYPASS GRAFT      double bypass surgery      FINGER SURGERY Left     FRACTURE SURGERY      right foot    PROSTATE SURGERY      right knee surgery         Social History    reports that he quit smoking about 40 years ago. His smoking use included cigarettes. He started smoking about 60 years ago. He has a 20 pack-year smoking history. He has never used smokeless tobacco. He reports current alcohol use. He reports that he does not use drugs.    Family History  's family history includes Hearing loss in his father; Heart disease in his father; Hyperlipidemia in his father; Hypertension in his father and mother; Lupus in his daughter; Migraines in his daughter; Osteoarthritis in his father; Sudden death in his father.    Medications and Allergies     Medications  Outpatient Medications Marked as Taking for the 11/5/24 encounter (Office Visit) with Zuleima Weber FNP   Medication Sig Dispense Refill    ascorbic acid, vitamin C, (VITAMIN C) 1000  MG tablet Take 1,000 mg by mouth 2 (two) times daily.      aspirin (ECOTRIN) 81 MG EC tablet Take 81 mg by mouth once daily.      atorvastatin (LIPITOR) 10 MG tablet 1 tablet Orally M W F      b complex vitamins tablet Take 1 tablet by mouth once daily.      blood-glucose meter Misc 1 Units by Misc.(Non-Drug; Combo Route) route once daily. Checking about 3x weekly      cinnamon bark 500 mg capsule Take 2 capsules by mouth once daily.      coenzyme Q10 100 mg capsule Take 1 capsule by mouth once daily.      cyanocobalamin 500 MCG tablet Take 1 tablet by mouth once daily.      fish,bora,flax oils-om3,6,9no1 (OMEGA 3-6-9) 1,200 mg Cap Take 1 capsule by mouth.      glucosamine HCl 1,500 mg Tab Take 1 tablet by mouth every other day.      glucosamine-chondroitin 500-400 mg tablet Take 1 tablet by mouth 3 (three) times daily.      ibuprofen (ADVIL,MOTRIN) 400 MG tablet Take 400 mg by mouth every 6 (six) hours as needed for Other.      ivermectin (STROMECTOL) 3 mg Tab Take 22.75 mg by mouth once a week.      magnesium 250 mg Tab Take 1 tablet by mouth once daily.      magnesium oxide (MAG-OX) 250 mg magnesium Tab Take 250 mg by mouth.      multivit-min/folic acid/vit K1 (MULTIVIT-MIN-FOLIC ACID-VIT K ORAL) Take by mouth.      multivitamin (THERAGRAN) per tablet Take 1 tablet by mouth once daily.      omega-3 fatty acids/fish oil (FISH OIL-OMEGA-3 FATTY ACIDS) 300-1,000 mg capsule Take by mouth once daily.      potassium 99 mg Tab Take 1 tablet by mouth 2 (two) times a day.      quercetin 500 mg Cap 1 tablet Orally Once a day      sildenafiL (VIAGRA) 100 MG tablet Take  mg by mouth.      vitamin D (VITAMIN D3) 1000 units Tab Take 1,000 Units by mouth once daily.      zinc gluconate 10 mg Lozg Take 1 tablet by mouth once daily.      [DISCONTINUED] metFORMIN (GLUCOPHAGE) 500 MG tablet Take 1 tablet (500 mg total) by mouth once daily. 90 tablet 1    [DISCONTINUED] NIFEdipine (ADALAT CC) 30 MG TbSR Take 1 tablet (30 mg  total) by mouth once daily. 90 tablet 1       Allergies  Review of patient's allergies indicates:   Allergen Reactions    Hydrocodone Itching     Other reaction(s): itching       Physical Examination     Vitals:    11/05/24 1454 11/05/24 1552   BP: (!) 146/84 (!) 140/70   BP Location: Left arm    Patient Position: Sitting    Pulse: 66    Resp: 20    Temp: 97.8 °F (36.6 °C)    TempSrc: Oral    SpO2: 97%    Weight: 103 kg (227 lb)    Height: 6' (1.829 m)       Physical Exam  Constitutional:       Appearance: Normal appearance.   HENT:      Head: Normocephalic.   Eyes:      Extraocular Movements: Extraocular movements intact.   Cardiovascular:      Rate and Rhythm: Normal rate and regular rhythm.      Pulses: Normal pulses.      Heart sounds: Normal heart sounds.   Pulmonary:      Effort: Pulmonary effort is normal.      Breath sounds: Normal breath sounds.   Skin:     General: Skin is warm and dry.      Capillary Refill: Capillary refill takes less than 2 seconds.   Neurological:      General: No focal deficit present.      Mental Status: He is alert and oriented to person, place, and time.   Psychiatric:         Mood and Affect: Mood normal.         Behavior: Behavior normal.          Assessment and Plan (including Health Maintenance)      Problem List  Smart Sets  Document Outside HM   :    Plan:     There are no Patient Instructions on file for this visit.       Health Maintenance Due   Topic Date Due    COVID-19 Vaccine (1) Never done    TETANUS VACCINE  Never done    RSV Vaccine (Age 60+ and Pregnant patients) (1 - Risk 60-74 years 1-dose series) Never done    Shingles Vaccine (1 of 2) 01/22/2014    Pneumococcal Vaccines (Age 65+) (3 of 3 - PPSV23 or PCV20) 03/02/2020    Hemoglobin A1c  07/23/2024    Eye Exam  10/16/2024       Problem List Items Addressed This Visit       Chronic kidney disease, stage 3a    Current Assessment & Plan     Pt will RTC for fasting labs closer to his V appt in January.           Diabetes mellitus, type 2    Current Assessment & Plan     Pt will RTC for fasting labs closer to his AW appt in January.          Relevant Medications    metFORMIN (GLUCOPHAGE) 500 MG tablet    Other Relevant Orders    Hemoglobin A1C    Hyperlipidemia    Current Assessment & Plan     Pt will RTC for fasting labs closer to his AW appt in January.          Relevant Orders    Lipid Panel    Hypertension - Primary    Current Assessment & Plan     Blood pressure is controlled. Current medical regimen is effective;   Will adjust according to results and monitor.          Relevant Medications    lisinopriL (PRINIVIL,ZESTRIL) 5 MG tablet    lisinopriL 10 MG tablet    Other Relevant Orders    CBC Auto Differential    Comprehensive Metabolic Panel    Microalbumin/Creatinine Ratio, Urine    Other long term (current) drug therapy    Current Assessment & Plan     Pt will RTC for fasting labs closer to his AW appt in January.          Relevant Medications    NIFEdipine (ADALAT CC) 30 MG TbSR    Prostate cancer    Current Assessment & Plan     Followed by Dr. Ireland.           Primary hypertension  -     lisinopriL (PRINIVIL,ZESTRIL) 5 MG tablet; Take 1 tablet (5 mg total) by mouth once daily.  Dispense: 90 tablet; Refill: 0  -     lisinopriL 10 MG tablet; Take 1 tablet (10 mg total) by mouth once daily.  Dispense: 90 tablet; Refill: 0  -     CBC Auto Differential; Future; Expected date: 02/05/2025  -     Comprehensive Metabolic Panel; Future; Expected date: 02/05/2025  -     Microalbumin/Creatinine Ratio, Urine; Future; Expected date: 02/05/2025    Type 2 diabetes mellitus without complication, without long-term current use of insulin  -     metFORMIN (GLUCOPHAGE) 500 MG tablet; Take 1 tablet (500 mg total) by mouth once daily.  Dispense: 90 tablet; Refill: 0  -     Hemoglobin A1C; Future; Expected date: 02/05/2025    Other long term (current) drug therapy  -     NIFEdipine (ADALAT CC) 30 MG TbSR; Take 1 tablet (30 mg  total) by mouth once daily.  Dispense: 90 tablet; Refill: 0    Chronic kidney disease, stage 3a    Prostate cancer    Mixed hyperlipidemia  -     Lipid Panel; Future; Expected date: 02/05/2025       Health Maintenance Topics with due status: Not Due       Topic Last Completion Date    Colorectal Cancer Screening 02/16/2023    Foot Exam 01/22/2024    Diabetes Urine Screening 01/23/2024    Lipid Panel 01/23/2024    PROSTATE-SPECIFIC ANTIGEN 09/23/2024    High Dose Statin 11/05/2024         Future Appointments   Date Time Provider Department Center   1/27/2025  3:00 PM [facility] NURSE, [place] [facility]        No follow-ups on file.    Health Maintenance Due   Topic Date Due    COVID-19 Vaccine (1) Never done    TETANUS VACCINE  Never done    RSV Vaccine (Age 60+ and Pregnant patients) (1 - Risk 60-74 years 1-dose series) Never done    Shingles Vaccine (1 of 2) 01/22/2014    Pneumococcal Vaccines (Age 65+) (3 of 3 - PPSV23 or PCV20) 03/02/2020    Hemoglobin A1c  07/23/2024    Eye Exam  10/16/2024        Signature:  CATIE Winter    Date of encounter: 11/5/24

## 2024-11-07 LAB
LEFT EYE DM RETINOPATHY: NEGATIVE
RIGHT EYE DM RETINOPATHY: NEGATIVE

## 2024-11-21 ENCOUNTER — PATIENT OUTREACH (OUTPATIENT)
Facility: HOSPITAL | Age: 74
End: 2024-11-21
Payer: MEDICARE

## 2025-01-22 NOTE — PATIENT INSTRUCTIONS
Counseling and Referral of Other Preventative  (Italic type indicates deductible and co-insurance are waived)    Patient Name: Darrell Urbina  Today's Date: 1/27/2025    Health Maintenance       Date Due Completion Date    COVID-19 Vaccine (1) Never done ---    TETANUS VACCINE Never done ---    RSV Vaccine (Age 60+ and Pregnant patients) (1 - Risk 60-74 years 1-dose series) Never done ---    Shingles Vaccine (1 of 2) 01/22/2014 11/27/2013    Pneumococcal Vaccines (Age 50+) (3 of 3 - PPSV23, PCV20 or PCV21) 03/02/2020 1/6/2020    Foot Exam 01/22/2025 1/22/2024    Override on 12/8/2021: Done    Hemoglobin A1c 07/24/2025 1/24/2025    PROSTATE-SPECIFIC ANTIGEN 09/23/2025 9/23/2024    High Dose Statin 11/05/2025 11/5/2024    Diabetic Eye Exam 11/07/2025 11/7/2024    Diabetes Urine Screening 01/24/2026 1/24/2025    Lipid Panel 01/24/2026 1/24/2025    Colorectal Cancer Screening 02/16/2028 2/16/2023        No orders of the defined types were placed in this encounter.      Counseling and Referral of Other Preventative  (Italic type indicates deductible and co-insurance are waived)    Patient Name: Darrell Urbina  Today's Date: 1/27/2025    Health Maintenance       Date Due Completion Date    COVID-19 Vaccine (1) Never done ---    TETANUS VACCINE Never done ---    RSV Vaccine (Age 60+ and Pregnant patients) (1 - Risk 60-74 years 1-dose series) Never done ---    Shingles Vaccine (1 of 2) 01/22/2014 11/27/2013    Pneumococcal Vaccines (Age 50+) (3 of 3 - PPSV23, PCV20 or PCV21) 03/02/2020 1/6/2020    Foot Exam 01/22/2025 1/22/2024    Override on 12/8/2021: Done    Hemoglobin A1c 07/24/2025 1/24/2025    PROSTATE-SPECIFIC ANTIGEN 09/23/2025 9/23/2024    High Dose Statin 11/05/2025 11/5/2024    Diabetic Eye Exam 11/07/2025 11/7/2024    Diabetes Urine Screening 01/24/2026 1/24/2025    Lipid Panel 01/24/2026 1/24/2025    Colorectal Cancer Screening 02/16/2028 2/16/2023        No orders of the defined types were placed in this  encounter.      The following information is provided to all patients.  This information is to help you find resources for any of the problems found today that may be affecting your health:                  Living healthy guide: [place].gov    Understanding Diabetes: www.diabetes.org      Eating healthy: www.cdc.gov/healthyweight      CDC home safety checklist: www.cdc.gov/steadi/patient.html      Agency on Aging: [place].gov    Alcoholics anonymous (AA): www.aa.org      Physical Activity: www.josh.nih.gov/ky4kxrs      Tobacco use: [place].gov

## 2025-01-22 NOTE — PROGRESS NOTES
Darrell Urbina presented for a follow-up Medicare AWV today. The following components were reviewed and updated:    Medical history  Family History  Social history  Allergies and Current Medications  Health Risk Assessment  Health Maintenance  Care Team    **See Completed Assessments for Annual Wellness visit with in the encounter summary    The following assessments were completed:  Depression Screening  Cognitive function Screening  Timed Get Up Test  Whisper Test      Opioid documentation:      Patient does not have a current opioid prescription.          Vitals:    01/27/25 1449   BP: 138/78   BP Location: Left arm   Patient Position: Sitting   Pulse: 66   Resp: 20   Temp: 98.4 °F (36.9 °C)   TempSrc: Oral   SpO2: 97%   Weight: 103.9 kg (229 lb 0.3 oz)   Height: 6' (1.829 m)     Body mass index is 31.06 kg/m².       Physical Exam  Constitutional:       Appearance: Normal appearance. He is obese.   HENT:      Head: Normocephalic.   Eyes:      Extraocular Movements: Extraocular movements intact.      Conjunctiva/sclera: Conjunctivae normal.   Cardiovascular:      Rate and Rhythm: Normal rate and regular rhythm.      Pulses: Normal pulses.           Dorsalis pedis pulses are 2+ on the right side and 2+ on the left side.        Posterior tibial pulses are 2+ on the right side and 2+ on the left side.      Heart sounds: Normal heart sounds.   Pulmonary:      Effort: Pulmonary effort is normal.      Breath sounds: Normal breath sounds.   Musculoskeletal:      Right foot: Normal range of motion. No deformity, bunion, Charcot foot, foot drop or prominent metatarsal heads.      Left foot: Normal range of motion. No deformity, bunion, Charcot foot, foot drop or prominent metatarsal heads.   Feet:      Right foot:      Protective Sensation: 10 sites tested.  10 sites sensed.      Skin integrity: Callus and dry skin present. No ulcer, blister, skin breakdown, erythema, warmth or fissure.      Toenail Condition: Right  "toenails are abnormally thick and long.      Left foot:      Protective Sensation: 10 sites tested.  10 sites sensed.      Skin integrity: Callus and dry skin present. No ulcer, blister, skin breakdown, erythema, warmth or fissure.      Toenail Condition: Left toenails are abnormally thick and long.   Neurological:      General: No focal deficit present.      Mental Status: He is alert and oriented to person, place, and time. Mental status is at baseline.   Psychiatric:         Mood and Affect: Mood normal.         Behavior: Behavior normal.           1. Encounter for subsequent annual wellness visit (AWV) in Medicare patient  Assessment & Plan:  Attend regularly scheduled office visits.  Monitor/keep log of BP outside of clinic.   Schedule colonoscopy as needed.  Take medications as prescribed.   Avoid adding table salt to foods.   Recommend regular physical activity 30 min most days of the week.        2. Type 2 diabetes mellitus without complication, without long-term current use of insulin  Assessment & Plan:  A1C up to 6.7 from 6.3. Pt states he eats a lot of "sugar". He declines need to increase his metformin and watch his "sugar" intake.     Orders:  -     metFORMIN (GLUCOPHAGE) 500 MG tablet; Take 1 tablet (500 mg total) by mouth once daily.  Dispense: 90 tablet; Refill: 3    3. Primary hypertension  Assessment & Plan:  Blood pressure is controlled. Current medical regimen is effective;   Will adjust according to results and monitor.     Orders:  -     lisinopriL (PRINIVIL,ZESTRIL) 5 MG tablet; Take 1 tablet (5 mg total) by mouth once daily.  Dispense: 90 tablet; Refill: 3  -     lisinopriL 10 MG tablet; Take 1 tablet (10 mg total) by mouth once daily.  Dispense: 90 tablet; Refill: 3    4. Mixed hyperlipidemia  Assessment & Plan:  Pt will not take statin med. Cholesterol levels are decreased from last check.       5. Chronic kidney disease, stage 3a  Assessment & Plan:  CKD  is controlled. Current medical " "regimen is effective;   Will adjust according to results and monitor.       6. Other long term (current) drug therapy  Assessment & Plan:  Pt stable.     Orders:  -     NIFEdipine (ADALAT CC) 30 MG TbSR; Take 1 tablet (30 mg total) by mouth once daily.  Dispense: 90 tablet; Refill: 3    7. History of prostate cancer  Assessment & Plan:  Followed by urology.       8. Other fatigue  Assessment & Plan:  Pt would like testosterone level checked. Instructed will have to RTC for lab only any day before 10:00AM and he does not have to be fasting. Pt vu.     Orders:  -     Testosterone, Total and Free, S; Future; Expected date: 02/27/2025    9. Plantar wart of both feet  Assessment & Plan:  Pt will make foot care appt to see about having plantar warts "shaved".       10. Callus of foot  Assessment & Plan:  Pt will make diabetic foot care appt to have calluses filed.              Provided Darrell with a 5-10 year written screening schedule and personal prevention plan. Recommendations were developed using the USPSTF age appropriate recommendations. Education, counseling, and referrals were provided as needed.  After Visit Summary printed and given to patient which includes a list of additional screenings\tests needed.    Follow up for yearly annual wellness visit  Declined all vaccines today    I offered to discuss advanced care planning, including how to pick a person who would make decisions for you if you were unable to make them for yourself, called a health care power of , and what kind of decisions you might make such as use of life sustaining treatments such as ventilators and tube feeding when faced with a life limiting illness recorded on a living will that they will need to know. (How you want to be cared for as you near the end of your natural life)     X Patient is interested in learning more about how to make advanced directives.  I provided them paperwork and offered to discuss this with them.  "

## 2025-01-27 ENCOUNTER — OFFICE VISIT (OUTPATIENT)
Dept: FAMILY MEDICINE | Facility: CLINIC | Age: 75
End: 2025-01-27
Payer: MEDICARE

## 2025-01-27 VITALS
WEIGHT: 229 LBS | HEART RATE: 66 BPM | DIASTOLIC BLOOD PRESSURE: 78 MMHG | TEMPERATURE: 98 F | BODY MASS INDEX: 31.02 KG/M2 | RESPIRATION RATE: 20 BRPM | HEIGHT: 72 IN | SYSTOLIC BLOOD PRESSURE: 138 MMHG | OXYGEN SATURATION: 97 %

## 2025-01-27 DIAGNOSIS — N18.31 CHRONIC KIDNEY DISEASE, STAGE 3A: ICD-10-CM

## 2025-01-27 DIAGNOSIS — Z79.899 OTHER LONG TERM (CURRENT) DRUG THERAPY: ICD-10-CM

## 2025-01-27 DIAGNOSIS — Z85.46 HISTORY OF PROSTATE CANCER: ICD-10-CM

## 2025-01-27 DIAGNOSIS — L84 CALLUS OF FOOT: ICD-10-CM

## 2025-01-27 DIAGNOSIS — B07.0 PLANTAR WART OF BOTH FEET: ICD-10-CM

## 2025-01-27 DIAGNOSIS — E11.9 TYPE 2 DIABETES MELLITUS WITHOUT COMPLICATION, WITHOUT LONG-TERM CURRENT USE OF INSULIN: ICD-10-CM

## 2025-01-27 DIAGNOSIS — E78.2 MIXED HYPERLIPIDEMIA: ICD-10-CM

## 2025-01-27 DIAGNOSIS — Z00.00 ENCOUNTER FOR SUBSEQUENT ANNUAL WELLNESS VISIT (AWV) IN MEDICARE PATIENT: Primary | ICD-10-CM

## 2025-01-27 DIAGNOSIS — R53.83 OTHER FATIGUE: ICD-10-CM

## 2025-01-27 DIAGNOSIS — I10 PRIMARY HYPERTENSION: ICD-10-CM

## 2025-01-27 PROCEDURE — G0439 PPPS, SUBSEQ VISIT: HCPCS | Mod: ,,, | Performed by: NURSE PRACTITIONER

## 2025-01-27 RX ORDER — NIFEDIPINE 30 MG/1
30 TABLET, EXTENDED RELEASE ORAL DAILY
Qty: 90 TABLET | Refills: 3 | Status: SHIPPED | OUTPATIENT
Start: 2025-01-27

## 2025-01-27 RX ORDER — LISINOPRIL 10 MG/1
10 TABLET ORAL DAILY
Qty: 90 TABLET | Refills: 3 | Status: SHIPPED | OUTPATIENT
Start: 2025-01-27 | End: 2026-01-27

## 2025-01-27 RX ORDER — LISINOPRIL 5 MG/1
5 TABLET ORAL DAILY
Qty: 90 TABLET | Refills: 3 | Status: SHIPPED | OUTPATIENT
Start: 2025-01-27 | End: 2026-01-27

## 2025-01-27 RX ORDER — METFORMIN HYDROCHLORIDE 500 MG/1
500 TABLET ORAL DAILY
Qty: 90 TABLET | Refills: 3 | Status: SHIPPED | OUTPATIENT
Start: 2025-01-27

## 2025-01-27 NOTE — ASSESSMENT & PLAN NOTE
"A1C up to 6.7 from 6.3. Pt states he eats a lot of "sugar". He declines need to increase his metformin and watch his "sugar" intake.   "

## 2025-01-27 NOTE — ASSESSMENT & PLAN NOTE
Pt would like testosterone level checked. Instructed will have to RTC for lab only any day before 10:00AM and he does not have to be fasting. Jesus Manuel gomes.

## 2025-01-29 ENCOUNTER — PATIENT MESSAGE (OUTPATIENT)
Dept: FAMILY MEDICINE | Facility: CLINIC | Age: 75
End: 2025-01-29
Payer: MEDICARE

## 2025-01-30 ENCOUNTER — OFFICE VISIT (OUTPATIENT)
Dept: FAMILY MEDICINE | Facility: CLINIC | Age: 75
End: 2025-01-30
Payer: MEDICARE

## 2025-01-30 VITALS
HEART RATE: 66 BPM | TEMPERATURE: 98 F | HEIGHT: 72 IN | WEIGHT: 226.31 LBS | OXYGEN SATURATION: 97 % | RESPIRATION RATE: 20 BRPM | SYSTOLIC BLOOD PRESSURE: 167 MMHG | DIASTOLIC BLOOD PRESSURE: 79 MMHG | BODY MASS INDEX: 30.65 KG/M2

## 2025-01-30 DIAGNOSIS — E08.65 DIABETES MELLITUS DUE TO UNDERLYING CONDITION WITH HYPERGLYCEMIA, WITHOUT LONG-TERM CURRENT USE OF INSULIN: Primary | ICD-10-CM

## 2025-01-30 DIAGNOSIS — L84 CALLUS OF FOOT: ICD-10-CM

## 2025-01-30 DIAGNOSIS — C61 PROSTATE CANCER: ICD-10-CM

## 2025-01-30 DIAGNOSIS — L60.2 THICKENED NAILS: ICD-10-CM

## 2025-01-30 DIAGNOSIS — B07.0 PLANTAR WART OF BOTH FEET: ICD-10-CM

## 2025-01-30 DIAGNOSIS — R53.83 OTHER FATIGUE: ICD-10-CM

## 2025-01-30 NOTE — ASSESSMENT & PLAN NOTE
"Provider cleaned areas to bilateral plantar feet; shaved areas down using derma blade then applied cryo freeze for a few seconds x 3. Pt states areas already feel better.   Instructed to keep areas clean and dry and warts should "slough" off after several days.   Instructed he may have to RTC for this again before next diabetic foot care and to let us know if appt is needed. Pt vu.   Instructed to RTC for any new/worsening/persisting ssx.    "

## 2025-01-30 NOTE — PROGRESS NOTES
CATIE Winter   [facility]  [address]  [place] [address]     PATIENT NAME: Darrell Urbina  : 1950  DATE: 25  MRN: [id number]      Billing Provider: CATIE Winter  Level of Service:   Patient PCP Information       Provider PCP Type    CATIE Winter General            Reason for Visit / Chief Complaint: Nail Care   Health Maintenance Due   Topic Date Due    COVID-19 Vaccine (1) Never done    TETANUS VACCINE  Never done    RSV Vaccine (Age 60+ and Pregnant patients) (1 - Risk 60-74 years 1-dose series) Never done    Shingles Vaccine (1 of 2) 2014    Pneumococcal Vaccines (Age 50+) (3 of 3 - PPSV23, PCV20 or PCV21) 2020          Update PCP  Update Chief Complaint         History of Present Illness / Problem Focused Workflow     History of Present Illness      Pt presents to clinic for diabetic foot care. Pt also has plantar warts to the bottom of both feet and would like to see about getting these removed. He also has calluses to both feet. Pt has thick, long toe nails  unable to be trimmed with standard clippers. He also has decreased hair growth to feet. He denies numbness to feet. He denies any other needs at this time.         Hemoglobin A1C   Date Value Ref Range Status   2025 6.7 <=7.0 % Final     Comment:       Normal:               <5.7%  Pre-Diabetic:       5.7% to 6.4%  Diabetic:             >6.4%  Diabetic Goal:     <7%   2024 6.3 4.5 - 6.6 % Final     Comment:       Normal:               <5.7%  Pre-Diabetic:       5.7% to 6.4%  Diabetic:             >6.4%  Diabetic Goal:     <7%   2023 5.9 4.5 - 6.6 % Final     Comment:       Normal:               <5.7%  Pre-Diabetic:       5.7% to 6.4%  Diabetic:             >6.4%  Diabetic Goal:     <7%        CMP  Sodium   Date Value Ref Range Status   2025 141 136 - 145 mmol/L Final     Potassium   Date Value Ref Range Status   2025 4.9 3.5 - 5.1 mmol/L Final     Chloride   Date Value  Ref Range Status   01/24/2025 106 98 - 107 mmol/L Final     CO2   Date Value Ref Range Status   01/24/2025 23 23 - 31 mmol/L Final     Glucose   Date Value Ref Range Status   01/24/2025 141 (H) 82 - 115 mg/dL Final     BUN   Date Value Ref Range Status   01/24/2025 19 8 - 26 mg/dL Final     Creatinine   Date Value Ref Range Status   01/24/2025 1.52 (H) 0.72 - 1.25 mg/dL Final     Calcium   Date Value Ref Range Status   01/24/2025 9.5 8.8 - 10.0 mg/dL Final     Total Protein   Date Value Ref Range Status   01/24/2025 7.4 5.8 - 7.6 g/dL Final     Albumin   Date Value Ref Range Status   01/24/2025 4.2 3.4 - 4.8 g/dL Final     Bilirubin, Total   Date Value Ref Range Status   01/24/2025 0.5 <=1.5 mg/dL Final     Alk Phos   Date Value Ref Range Status   01/24/2025 42 40 - 150 U/L Final     AST   Date Value Ref Range Status   01/24/2025 41 (H) 5 - 34 U/L Final     ALT   Date Value Ref Range Status   01/24/2025 92 (H) <=55 U/L Final     Anion Gap   Date Value Ref Range Status   01/24/2025 17 (H) 7 - 16 mmol/L Final     eGFR   Date Value Ref Range Status   01/24/2025 48 (L) >=60 mL/min/1.73m2 Final        Lab Results   Component Value Date    WBC 5.27 01/24/2025    RBC 4.88 01/24/2025    HGB 16.4 01/24/2025    HCT 49.6 01/24/2025    .6 (H) 01/24/2025    MCH 33.6 (H) 01/24/2025    MCHC 33.1 01/24/2025    RDW 12.4 01/24/2025     01/24/2025    MPV 10.4 01/24/2025    LYMPH 24.5 (L) 01/24/2025    LYMPH 1.29 01/24/2025    MONO 9.3 (H) 01/24/2025    EOS 0.14 01/24/2025    BASO 0.03 01/24/2025    EOSINOPHIL 2.7 01/24/2025    BASOPHIL 0.6 01/24/2025        Lab Results   Component Value Date    CHOL 225 (H) 01/24/2025    CHOL 258 (H) 01/23/2024    CHOL 195 01/11/2023     Lab Results   Component Value Date    HDL 36 01/24/2025    HDL 35 (L) 01/23/2024    HDL 31 (L) 01/11/2023     Lab Results   Component Value Date    LDLCALC 141 01/24/2025    LDLCALC 160 01/23/2024    LDLCALC 110 01/11/2023     Lab Results   Component  Value Date    TRIG 241 (H) 01/24/2025    TRIG 317 (H) 01/23/2024    TRIG 270 (H) 01/11/2023     Lab Results   Component Value Date    CHOLHDL 6.3 01/24/2025    CHOLHDL 7.4 01/23/2024    CHOLHDL 6.3 01/11/2023        Wt Readings from Last 3 Encounters:   01/30/25 0959 102.6 kg (226 lb 4.8 oz)   01/27/25 1449 103.9 kg (229 lb 0.3 oz)   11/05/24 1454 103 kg (227 lb)        BP Readings from Last 3 Encounters:   01/30/25 (!) 167/79   01/27/25 138/78   11/05/24 (!) 140/70        Review of Systems     Review of Systems   Constitutional: Negative.    Eyes: Negative.    Respiratory: Negative.     Cardiovascular: Negative.    Gastrointestinal: Negative.    Endocrine: Negative.    Genitourinary: Negative.    Musculoskeletal: Negative.    Integumentary:         Thick long toe nails, dry skin, calluses, plantar warts to both feet   Allergic/Immunologic: Negative.    Neurological: Negative.    Hematological: Negative.    Psychiatric/Behavioral: Negative.            Medical / Social / Family History     Past Medical History:   Diagnosis Date    Diabetes mellitus, type 2     Elevated PSA     Hyperlipidemia     Hypertension     Hypomagnesemia     Multiple vessel coronary artery disease     OA (osteoarthritis)     Prostate cancer        Past Surgical History:   Procedure Laterality Date    APPENDECTOMY      COLONOSCOPY  02/05/2018    CORONARY ARTERY BYPASS GRAFT      double bypass surgery      FINGER SURGERY Left     FRACTURE SURGERY      right foot    PROSTATE SURGERY      right knee surgery         Social History    reports that he quit smoking about 41 years ago. His smoking use included cigarettes. He started smoking about 61 years ago. He has a 20 pack-year smoking history. He has never used smokeless tobacco. He reports current alcohol use. He reports that he does not use drugs.    Family History  's family history includes Hearing loss in his father; Heart disease in his father; Hyperlipidemia in his father; Hypertension  in his father and mother; Lupus in his daughter; Migraines in his daughter; Osteoarthritis in his father; Sudden death in his father.    Medications and Allergies     Medications  Outpatient Medications Marked as Taking for the 1/30/25 encounter (Office Visit) with Zuleima Weber FNP   Medication Sig Dispense Refill    ascorbic acid, vitamin C, (VITAMIN C) 1000 MG tablet Take 1,000 mg by mouth 2 (two) times daily.      aspirin (ECOTRIN) 81 MG EC tablet Take 81 mg by mouth once daily.      b complex vitamins tablet Take 1 tablet by mouth once daily.      cinnamon bark 500 mg capsule Take 2 capsules by mouth once daily.      coenzyme Q10 100 mg capsule Take 1 capsule by mouth once daily.      cyanocobalamin 500 MCG tablet Take 1 tablet by mouth once daily.      fish,bora,flax oils-om3,6,9no1 (OMEGA 3-6-9) 1,200 mg Cap Take 1 capsule by mouth.      glucosamine HCl 1,500 mg Tab Take 1 tablet by mouth every other day.      glucosamine-chondroitin 500-400 mg tablet Take 1 tablet by mouth 3 (three) times daily.      ibuprofen (ADVIL,MOTRIN) 400 MG tablet Take 400 mg by mouth every 6 (six) hours as needed for Other.      ivermectin (STROMECTOL) 3 mg Tab Take 22.75 mg by mouth once a week.      lisinopriL (PRINIVIL,ZESTRIL) 5 MG tablet Take 1 tablet (5 mg total) by mouth once daily. 90 tablet 3    lisinopriL 10 MG tablet Take 1 tablet (10 mg total) by mouth once daily. 90 tablet 3    magnesium 250 mg Tab Take 1 tablet by mouth once daily.      magnesium oxide (MAG-OX) 250 mg magnesium Tab Take 250 mg by mouth.      metFORMIN (GLUCOPHAGE) 500 MG tablet Take 1 tablet (500 mg total) by mouth once daily. 90 tablet 3    multivit-min/folic acid/vit K1 (MULTIVIT-MIN-FOLIC ACID-VIT K ORAL) Take by mouth.      multivitamin (THERAGRAN) per tablet Take 1 tablet by mouth once daily.      NIFEdipine (ADALAT CC) 30 MG TbSR Take 1 tablet (30 mg total) by mouth once daily. 90 tablet 3    omega-3 fatty acids/fish oil (FISH OIL-OMEGA-3 FATTY  ACIDS) 300-1,000 mg capsule Take by mouth once daily.      potassium 99 mg Tab Take 1 tablet by mouth 2 (two) times a day. Every other day      quercetin 500 mg Cap 1 tablet Orally Once a day      sildenafiL (VIAGRA) 100 MG tablet Take  mg by mouth.      vitamin D (VITAMIN D3) 1000 units Tab Take 1,000 Units by mouth once daily.      zinc gluconate 10 mg Lozg Take 1 tablet by mouth once daily.         Allergies  Review of patient's allergies indicates:   Allergen Reactions    Hydrocodone Itching     Other reaction(s): itching       Physical Examination     Vitals:    01/30/25 0959   BP: (!) 167/79   BP Location: Left arm   Patient Position: Sitting   Pulse: 66   Resp: 20   Temp: 97.9 °F (36.6 °C)   TempSrc: Oral   SpO2: 97%   Weight: 102.6 kg (226 lb 4.8 oz)   Height: 6' (1.829 m)      Physical Exam  Constitutional:       Appearance: Normal appearance. He is obese.   HENT:      Head: Normocephalic.   Eyes:      Extraocular Movements: Extraocular movements intact.   Cardiovascular:      Rate and Rhythm: Normal rate and regular rhythm.      Pulses: Normal pulses.           Dorsalis pedis pulses are 2+ on the right side and 2+ on the left side.        Posterior tibial pulses are 2+ on the right side and 2+ on the left side.      Heart sounds: Normal heart sounds.   Pulmonary:      Effort: Pulmonary effort is normal.      Breath sounds: Normal breath sounds.   Musculoskeletal:      Right foot: Normal range of motion. No deformity, bunion, Charcot foot, foot drop or prominent metatarsal heads.      Left foot: Normal range of motion. No deformity, bunion, Charcot foot, foot drop or prominent metatarsal heads.        Feet:    Feet:      Right foot:      Protective Sensation: 10 sites tested.  10 sites sensed.      Skin integrity: Callus and dry skin present. No ulcer, blister, skin breakdown, erythema, warmth or fissure.      Toenail Condition: Right toenails are abnormally thick, long and ingrown.      Left foot:       Protective Sensation: 10 sites tested.  10 sites sensed.      Skin integrity: Callus and dry skin present. No ulcer, blister, skin breakdown, erythema, warmth or fissure.      Toenail Condition: Left toenails are abnormally thick, long and ingrown.      Comments: Pt with plantar warts to bilateral feet; nails are ingrowing without infection  Skin:     General: Skin is warm and dry.      Capillary Refill: Capillary refill takes less than 2 seconds.   Neurological:      General: No focal deficit present.      Mental Status: He is alert and oriented to person, place, and time.   Psychiatric:         Mood and Affect: Mood normal.         Behavior: Behavior normal.          Assessment and Plan (including Health Maintenance)      Problem List  Smart Sets  Document Outside HM   :    Plan:     There are no Patient Instructions on file for this visit.       Health Maintenance Due   Topic Date Due    COVID-19 Vaccine (1) Never done    TETANUS VACCINE  Never done    RSV Vaccine (Age 60+ and Pregnant patients) (1 - Risk 60-74 years 1-dose series) Never done    Shingles Vaccine (1 of 2) 01/22/2014    Pneumococcal Vaccines (Age 50+) (3 of 3 - PPSV23, PCV20 or PCV21) 03/02/2020       Problem List Items Addressed This Visit       Callus of foot    Current Assessment & Plan     See procedure note.          Diabetes mellitus due to underlying condition with hyperglycemia, without long-term current use of insulin - Primary    Current Assessment & Plan     1) Follow your diabetic diet as directed  2) Regular physical activity as directed   3) Check your blood sugar levels as directed  4) Take your medications as directed  5) Follow up in 3 months     Taking good care of your feet is very important!  1) Look at your bare feet daily for blisters, red spots, cuts, cracks, swelling or sores. If you can not see well, you can use a mirror to look or have someone help you.  2) Report promptly to your health care provider if you notice any  "changes in your feet as noted above or if your feet change colors, shape or just do not "feel right" for example: if your feet become less sensitive or begin hurting.  3) Wash your feet everyday. Use warm  not hot water. Check the water temperature with your wrist or other body part - not your feet.  4) Dry your feet well. Make sure you pat them dry. Do not rub the skin on your feet too hard. Make sure to dry well between your toes. Allowing moisture to stay between your toes or on your feet could lead to infection.  5) Keep your feet soft and smooth. Rub a thin coat of lotion on your feet each day but do not put lotion between your toes.  6) Wear socks and shoes at all times.  7) Never walk barefoot.          Other fatigue    Current Assessment & Plan     Testosterone level pending. Will inform of results when available which may take several days to return. Pt vu.          Plantar wart of both feet    Current Assessment & Plan     Provider cleaned areas to bilateral plantar feet; shaved areas down using derma blade then applied cryo freeze for a few seconds x 3. Pt states areas already feel better.   Instructed to keep areas clean and dry and warts should "slough" off after several days.   Instructed he may have to RTC for this again before next diabetic foot care and to let us know if appt is needed. Pt vu.   Instructed to RTC for any new/worsening/persisting ssx.           Prostate cancer    Current Assessment & Plan     Followed by Dr. Ireland.          Thickened nails    Current Assessment & Plan     See procedure note.           Assessment & Plan             Diabetes mellitus due to underlying condition with hyperglycemia, without long-term current use of insulin    Other fatigue  -     Testosterone, Total and Free, S    Prostate cancer    Plantar wart of both feet    Callus of foot    Thickened nails       Health Maintenance Topics with due status: Not Due       Topic Last Completion Date    Colorectal " Cancer Screening 02/16/2023    PROSTATE-SPECIFIC ANTIGEN 09/23/2024    High Dose Statin 11/05/2024    Diabetic Eye Exam 11/07/2024    Diabetes Urine Screening 01/24/2025    Lipid Panel 01/24/2025    Hemoglobin A1c 01/24/2025    Foot Exam 01/27/2025         Future Appointments   Date Time Provider Department Center   4/30/2025  9:20 AM Zuleima Weber FNP [facility]   1/12/2026  3:00 PM AWV NURSEANNA [facility]        No follow-ups on file.    Health Maintenance Due   Topic Date Due    COVID-19 Vaccine (1) Never done    TETANUS VACCINE  Never done    RSV Vaccine (Age 60+ and Pregnant patients) (1 - Risk 60-74 years 1-dose series) Never done    Shingles Vaccine (1 of 2) 01/22/2014    Pneumococcal Vaccines (Age 50+) (3 of 3 - PPSV23, PCV20 or PCV21) 03/02/2020        Signature:  CATIE Winter    Date of encounter: 1/30/25  This note was generated with the assistance of ambient listening technology. Verbal consent was obtained by the patient and accompanying visitor(s) for the recording of patient appointment to facilitate this note. I attest to having reviewed and edited the generated note for accuracy, though some syntax or spelling errors may persist. Please contact the author of this note for any clarification.

## 2025-01-30 NOTE — PROCEDURES
"Foot Care    Date/Time: 1/30/2025 10:00 AM    Performed by: Zulemia Weber FNP  Authorized by: Zuleima Weber FNP    Time out: Immediately prior to procedure a "time out" was called to verify the correct patient, procedure, equipment, support staff and site/side marked as required.    Consent Done?:  Yes (Verbal)    Nail Care Type:  Trim  Location(s): All  (Left 1st Toe, Left 3rd Toe, Left 2nd Toe, Left 4th Toe, Left 5th Toe, Right 1st Toe, Right 2nd Toe, Right 3rd Toe, Right 4th Toe and Right 5th Toe)  Patient tolerance:  Patient tolerated the procedure well with no immediate complications     Filed calluses smooth to bilateral feet. Pt yash well.     "

## 2025-01-30 NOTE — ASSESSMENT & PLAN NOTE
"1) Follow your diabetic diet as directed  2) Regular physical activity as directed   3) Check your blood sugar levels as directed  4) Take your medications as directed  5) Follow up in 3 months     Taking good care of your feet is very important!  1) Look at your bare feet daily for blisters, red spots, cuts, cracks, swelling or sores. If you can not see well, you can use a mirror to look or have someone help you.  2) Report promptly to your health care provider if you notice any changes in your feet as noted above or if your feet change colors, shape or just do not "feel right" for example: if your feet become less sensitive or begin hurting.  3) Wash your feet everyday. Use warm  not hot water. Check the water temperature with your wrist or other body part - not your feet.  4) Dry your feet well. Make sure you pat them dry. Do not rub the skin on your feet too hard. Make sure to dry well between your toes. Allowing moisture to stay between your toes or on your feet could lead to infection.  5) Keep your feet soft and smooth. Rub a thin coat of lotion on your feet each day but do not put lotion between your toes.  6) Wear socks and shoes at all times.  7) Never walk barefoot.   "

## 2025-01-30 NOTE — ASSESSMENT & PLAN NOTE
Testosterone level pending. Will inform of results when available which may take several days to return. Pt vu.

## 2025-02-11 PROBLEM — E11.8 DIABETES MELLITUS TYPE 2 WITH COMPLICATIONS: Status: ACTIVE | Noted: 2025-02-11

## 2025-03-17 DIAGNOSIS — C61 PROSTATE CANCER: Primary | ICD-10-CM

## 2025-03-18 PROCEDURE — 88342 IMHCHEM/IMCYTCHM 1ST ANTB: CPT | Mod: 26,,, | Performed by: PATHOLOGY

## 2025-03-18 PROCEDURE — 88305 TISSUE EXAM BY PATHOLOGIST: CPT | Mod: 26,,, | Performed by: PATHOLOGY

## 2025-03-18 PROCEDURE — 88342 IMHCHEM/IMCYTCHM 1ST ANTB: CPT | Mod: TC,91,SUR | Performed by: DERMATOLOGY

## 2025-03-19 ENCOUNTER — LAB REQUISITION (OUTPATIENT)
Dept: LAB | Facility: HOSPITAL | Age: 75
End: 2025-03-19
Attending: DERMATOLOGY
Payer: MEDICARE

## 2025-03-19 DIAGNOSIS — D49.2 NEOPLASM OF UNSPECIFIED BEHAVIOR OF BONE, SOFT TISSUE, AND SKIN: ICD-10-CM

## 2025-03-20 LAB
DHEA SERPL-MCNC: NORMAL
ESTROGEN SERPL-MCNC: NORMAL PG/ML
INSULIN SERPL-ACNC: NORMAL U[IU]/ML
LAB AP GROSS DESCRIPTION: NORMAL
LAB AP LABORATORY NOTES: NORMAL
LAB AP SPEC A DDX: NORMAL
LAB AP SPEC A MORPHOLOGY: NORMAL
LAB AP SPEC A PROCEDURE: NORMAL
LAB AP SPEC B DDX: NORMAL
LAB AP SPEC B MORPHOLOGY: NORMAL
LAB AP SPEC B PROCEDURE: NORMAL
T3RU NFR SERPL: NORMAL %

## 2025-04-30 ENCOUNTER — OFFICE VISIT (OUTPATIENT)
Dept: FAMILY MEDICINE | Facility: CLINIC | Age: 75
End: 2025-04-30
Payer: MEDICARE

## 2025-04-30 VITALS
HEIGHT: 72 IN | SYSTOLIC BLOOD PRESSURE: 141 MMHG | BODY MASS INDEX: 30.34 KG/M2 | HEART RATE: 62 BPM | DIASTOLIC BLOOD PRESSURE: 78 MMHG | RESPIRATION RATE: 19 BRPM | OXYGEN SATURATION: 98 % | TEMPERATURE: 98 F | WEIGHT: 224 LBS

## 2025-04-30 DIAGNOSIS — B07.0 PLANTAR WART OF BOTH FEET: ICD-10-CM

## 2025-04-30 DIAGNOSIS — L60.2 THICKENED NAILS: ICD-10-CM

## 2025-04-30 DIAGNOSIS — L84 CALLUS OF FOOT: ICD-10-CM

## 2025-04-30 DIAGNOSIS — M15.0 PRIMARY OSTEOARTHRITIS INVOLVING MULTIPLE JOINTS: ICD-10-CM

## 2025-04-30 DIAGNOSIS — E11.8 DIABETES MELLITUS TYPE 2 WITH COMPLICATIONS: Primary | ICD-10-CM

## 2025-04-30 PROCEDURE — 11719 TRIM NAIL(S) ANY NUMBER: CPT | Mod: Q8,,, | Performed by: NURSE PRACTITIONER

## 2025-04-30 PROCEDURE — 99213 OFFICE O/P EST LOW 20 MIN: CPT | Mod: ,,, | Performed by: NURSE PRACTITIONER

## 2025-04-30 RX ORDER — MUPIROCIN 20 MG/G
OINTMENT TOPICAL
COMMUNITY
Start: 2025-03-18

## 2025-05-26 ENCOUNTER — PATIENT OUTREACH (OUTPATIENT)
Facility: HOSPITAL | Age: 75
End: 2025-05-26
Payer: MEDICARE

## 2025-05-26 NOTE — PROGRESS NOTES
Population Health Chart Review & Patient Outreach Details        Health Maintenance Topics Addressed and Outreach Outcomes / Actions Taken:  Statin intolerance [x] Patient has statin intolerance noted in comments. I notified the provider to drop dx code for this patient

## 2025-05-27 NOTE — ASSESSMENT & PLAN NOTE
1) Follow your diabetic diet as directed  2) Regular physical activity as directed   3) Check your blood sugar levels as directed  4) Take your medications as directed  5) Follow up in 3 months      5

## 2025-05-27 NOTE — PROCEDURES
"Foot Care    Date/Time: 4/30/2025 9:20 AM    Performed by: Zuleima Weber FNP  Authorized by: Zuleima Weber FNP    Time out: Immediately prior to procedure a "time out" was called to verify the correct patient, procedure, equipment, support staff and site/side marked as required.    Consent Done?:  Yes (Verbal)    Nail Care Type:  Trim  Location(s): All  (Left 1st Toe, Left 3rd Toe, Left 2nd Toe, Left 4th Toe, Left 5th Toe, Right 1st Toe, Right 2nd Toe, Right 3rd Toe, Right 4th Toe and Right 5th Toe)  Patient tolerance:  Patient tolerated the procedure well with no immediate complications     Filed calluses smooth to bilateral feet. Pt yash well.     "

## 2025-05-27 NOTE — PROGRESS NOTES
CATIE Winter   [facility]  [address]  [place] [address]     PATIENT NAME: Darrell Urbina  : 1950  DATE: 25  MRN: [id number]      Billing Provider: CATIE Winter  Level of Service:   Patient PCP Information       Provider PCP Type    CATIE Winter General            Reason for Visit / Chief Complaint: Nail Care (Pt is here for footcare. He is a diabetic)   Health Maintenance Due   Topic Date Due    COVID-19 Vaccine (1) Never done    TETANUS VACCINE  Never done    RSV Vaccine (Age 60+ and Pregnant patients) (1 - Risk 60-74 years 1-dose series) Never done    Shingles Vaccine (1 of 2) 2014    Pneumococcal Vaccines (Age 50+) (3 of 3 - PPSV23, PCV20 or PCV21) 2020          Update PCP  Update Chief Complaint         History of Present Illness / Problem Focused Workflow     History of Present Illness      Pt presents to clinic for diabetic foot care. Pt also has plantar warts to the bottom of both feet and would like to see about getting these removed. He also has calluses to both feet. Pt has thick, long toe nails  unable to be trimmed with standard clippers. He also has decreased hair growth to feet. He denies numbness to feet. He denies any other needs at this time.         Hemoglobin A1C   Date Value Ref Range Status   2025 6.7 <=7.0 % Final     Comment:       Normal:               <5.7%  Pre-Diabetic:       5.7% to 6.4%  Diabetic:             >6.4%  Diabetic Goal:     <7%   2024 6.3 4.5 - 6.6 % Final     Comment:       Normal:               <5.7%  Pre-Diabetic:       5.7% to 6.4%  Diabetic:             >6.4%  Diabetic Goal:     <7%   2023 5.9 4.5 - 6.6 % Final     Comment:       Normal:               <5.7%  Pre-Diabetic:       5.7% to 6.4%  Diabetic:             >6.4%  Diabetic Goal:     <7%        CMP  Sodium   Date Value Ref Range Status   2025 141 136 - 145 mmol/L Final     Potassium   Date Value Ref Range Status   2025 4.9 3.5 -  5.1 mmol/L Final     Chloride   Date Value Ref Range Status   01/24/2025 106 98 - 107 mmol/L Final     CO2   Date Value Ref Range Status   01/24/2025 23 23 - 31 mmol/L Final     Glucose   Date Value Ref Range Status   01/24/2025 141 (H) 82 - 115 mg/dL Final     BUN   Date Value Ref Range Status   01/24/2025 19 8 - 26 mg/dL Final     Creatinine   Date Value Ref Range Status   01/24/2025 1.52 (H) 0.72 - 1.25 mg/dL Final     Calcium   Date Value Ref Range Status   01/24/2025 9.5 8.8 - 10.0 mg/dL Final     Total Protein   Date Value Ref Range Status   01/24/2025 7.4 5.8 - 7.6 g/dL Final     Albumin   Date Value Ref Range Status   01/24/2025 4.2 3.4 - 4.8 g/dL Final     Bilirubin, Total   Date Value Ref Range Status   01/24/2025 0.5 <=1.5 mg/dL Final     Alk Phos   Date Value Ref Range Status   01/24/2025 42 40 - 150 U/L Final     AST   Date Value Ref Range Status   01/24/2025 41 (H) 5 - 34 U/L Final     ALT   Date Value Ref Range Status   01/24/2025 92 (H) <=55 U/L Final     Anion Gap   Date Value Ref Range Status   01/24/2025 17 (H) 7 - 16 mmol/L Final     eGFR   Date Value Ref Range Status   01/24/2025 48 (L) >=60 mL/min/1.73m2 Final        Lab Results   Component Value Date    WBC 5.27 01/24/2025    RBC 4.88 01/24/2025    HGB 16.4 01/24/2025    HCT 49.6 01/24/2025    .6 (H) 01/24/2025    MCH 33.6 (H) 01/24/2025    MCHC 33.1 01/24/2025    RDW 12.4 01/24/2025     01/24/2025    MPV 10.4 01/24/2025    LYMPH 24.5 (L) 01/24/2025    LYMPH 1.29 01/24/2025    MONO 9.3 (H) 01/24/2025    EOS 0.14 01/24/2025    BASO 0.03 01/24/2025    EOSINOPHIL 2.7 01/24/2025    BASOPHIL 0.6 01/24/2025        Lab Results   Component Value Date    CHOL 225 (H) 01/24/2025    CHOL 258 (H) 01/23/2024    CHOL 195 01/11/2023     Lab Results   Component Value Date    HDL 36 01/24/2025    HDL 35 (L) 01/23/2024    HDL 31 (L) 01/11/2023     Lab Results   Component Value Date    LDLCALC 141 01/24/2025    LDLCALC 160 01/23/2024    LDLCALC 110  01/11/2023     Lab Results   Component Value Date    TRIG 241 (H) 01/24/2025    TRIG 317 (H) 01/23/2024    TRIG 270 (H) 01/11/2023     Lab Results   Component Value Date    CHOLHDL 6.3 01/24/2025    CHOLHDL 7.4 01/23/2024    CHOLHDL 6.3 01/11/2023        Wt Readings from Last 3 Encounters:   04/30/25 0927 101.6 kg (224 lb)   01/30/25 0959 102.6 kg (226 lb 4.8 oz)   01/27/25 1449 103.9 kg (229 lb 0.3 oz)        BP Readings from Last 3 Encounters:   04/30/25 (!) 141/78   01/30/25 (!) 167/79   01/27/25 138/78        Review of Systems     Review of Systems   Constitutional: Negative.    Eyes: Negative.    Respiratory: Negative.     Cardiovascular: Negative.    Gastrointestinal: Negative.    Endocrine: Negative.    Genitourinary: Negative.    Musculoskeletal: Negative.    Integumentary:         Thick long toe nails, dry skin, calluses, plantar warts to both feet   Allergic/Immunologic: Negative.    Neurological: Negative.    Hematological: Negative.    Psychiatric/Behavioral: Negative.            Medical / Social / Family History     Past Medical History:   Diagnosis Date    Diabetes mellitus, type 2     Elevated PSA     Hyperlipidemia     Hypertension     Hypomagnesemia     Multiple vessel coronary artery disease     OA (osteoarthritis)     Prostate cancer        Past Surgical History:   Procedure Laterality Date    APPENDECTOMY  1958    no    COLONOSCOPY  02/05/2018    CORONARY ARTERY BYPASS GRAFT  2001    sucked    double bypass surgery      FINGER SURGERY Left     FRACTURE SURGERY  1982    right foot    PROSTATE SURGERY  2020    sucked    right knee surgery         Social History    reports that he quit smoking about 41 years ago. His smoking use included cigarettes. He started smoking about 61 years ago. He has a 20 pack-year smoking history. He has been exposed to tobacco smoke. He has never used smokeless tobacco. He reports current alcohol use. He reports that he does not use drugs.    Family History  Mr.'s  family history includes Alcohol abuse in his father; Cancer in his maternal grandmother; Hearing loss in his father and mother; Heart disease in his father; Hyperlipidemia in his father; Hypertension in his father and mother; Lupus in his daughter; Migraines in his daughter; Osteoarthritis in his father; Sudden death in his father; Vision loss in his mother.    Medications and Allergies     Medications  Outpatient Medications Marked as Taking for the 4/30/25 encounter (Office Visit) with Zuleima Weber FNP   Medication Sig Dispense Refill    ascorbic acid, vitamin C, (VITAMIN C) 1000 MG tablet Take 1,000 mg by mouth 2 (two) times daily.      aspirin (ECOTRIN) 81 MG EC tablet Take 81 mg by mouth once daily.      b complex vitamins tablet Take 1 tablet by mouth once daily.      cinnamon bark 500 mg capsule Take 2 capsules by mouth once daily.      coenzyme Q10 100 mg capsule Take 1 capsule by mouth once daily.      cyanocobalamin 500 MCG tablet Take 1 tablet by mouth once daily.      fish,bora,flax oils-om3,6,9no1 (OMEGA 3-6-9) 1,200 mg Cap Take 1 capsule by mouth.      glucosamine HCl 1,500 mg Tab Take 1 tablet by mouth every other day.      glucosamine-chondroitin 500-400 mg tablet Take 1 tablet by mouth 3 (three) times daily.      ibuprofen (ADVIL,MOTRIN) 400 MG tablet Take 400 mg by mouth every 6 (six) hours as needed for Other.      ivermectin (STROMECTOL) 3 mg Tab Take 22.75 mg by mouth once a week.      lisinopriL (PRINIVIL,ZESTRIL) 5 MG tablet Take 1 tablet (5 mg total) by mouth once daily. 90 tablet 3    lisinopriL 10 MG tablet Take 1 tablet (10 mg total) by mouth once daily. 90 tablet 3    magnesium 250 mg Tab Take 1 tablet by mouth once daily.      magnesium oxide (MAG-OX) 250 mg magnesium Tab Take 250 mg by mouth.      metFORMIN (GLUCOPHAGE) 500 MG tablet Take 1 tablet (500 mg total) by mouth once daily. 90 tablet 3    multivit-min/folic acid/vit K1 (MULTIVIT-MIN-FOLIC ACID-VIT K ORAL) Take by mouth.       multivitamin (THERAGRAN) per tablet Take 1 tablet by mouth once daily.      mupirocin (BACTROBAN) 2 % ointment SMARTSIG:Both Nares Daily      NIFEdipine (ADALAT CC) 30 MG TbSR Take 1 tablet (30 mg total) by mouth once daily. 90 tablet 3    omega-3 fatty acids/fish oil (FISH OIL-OMEGA-3 FATTY ACIDS) 300-1,000 mg capsule Take by mouth once daily.      potassium 99 mg Tab Take 1 tablet by mouth 2 (two) times a day. Every other day      quercetin 500 mg Cap 1 tablet Orally Once a day      sildenafiL (VIAGRA) 100 MG tablet Take  mg by mouth.      vitamin D (VITAMIN D3) 1000 units Tab Take 1,000 Units by mouth once daily.      zinc gluconate 10 mg Lozg Take 1 tablet by mouth once daily.         Allergies  Review of patient's allergies indicates:   Allergen Reactions    Hydrocodone Itching     Other reaction(s): itching       Physical Examination     Vitals:    04/30/25 0927   BP: (!) 141/78   BP Location: Right arm   Patient Position: Sitting   Pulse: 62   Resp: 19   Temp: 98.2 °F (36.8 °C)   TempSrc: Oral   SpO2: 98%   Weight: 101.6 kg (224 lb)   Height: 6' (1.829 m)      Physical Exam  Constitutional:       Appearance: Normal appearance. He is obese.   HENT:      Head: Normocephalic.   Eyes:      Extraocular Movements: Extraocular movements intact.   Cardiovascular:      Rate and Rhythm: Normal rate and regular rhythm.      Pulses: Normal pulses.           Dorsalis pedis pulses are 2+ on the right side and 2+ on the left side.        Posterior tibial pulses are 2+ on the right side and 2+ on the left side.      Heart sounds: Normal heart sounds.   Pulmonary:      Effort: Pulmonary effort is normal.      Breath sounds: Normal breath sounds.   Musculoskeletal:      Right foot: Normal range of motion. No deformity, bunion, Charcot foot, foot drop or prominent metatarsal heads.      Left foot: Normal range of motion. No deformity, bunion, Charcot foot, foot drop or prominent metatarsal heads.        Feet:    Feet:       Right foot:      Protective Sensation: 10 sites tested.  10 sites sensed.      Skin integrity: Callus and dry skin present. No ulcer, blister, skin breakdown, erythema, warmth or fissure.      Toenail Condition: Right toenails are abnormally thick, long and ingrown.      Left foot:      Protective Sensation: 10 sites tested.  10 sites sensed.      Skin integrity: Callus and dry skin present. No ulcer, blister, skin breakdown, erythema, warmth or fissure.      Toenail Condition: Left toenails are abnormally thick, long and ingrown.      Comments: Pt with plantar warts to bilateral feet; nails are ingrowing without infection  Skin:     General: Skin is warm and dry.      Capillary Refill: Capillary refill takes less than 2 seconds.   Neurological:      General: No focal deficit present.      Mental Status: He is alert and oriented to person, place, and time.   Psychiatric:         Mood and Affect: Mood normal.         Behavior: Behavior normal.          Assessment and Plan (including Health Maintenance)      Problem List  Smart Sets  Document Outside HM   :    Plan:     There are no Patient Instructions on file for this visit.       Health Maintenance Due   Topic Date Due    COVID-19 Vaccine (1) Never done    TETANUS VACCINE  Never done    RSV Vaccine (Age 60+ and Pregnant patients) (1 - Risk 60-74 years 1-dose series) Never done    Shingles Vaccine (1 of 2) 01/22/2014    Pneumococcal Vaccines (Age 50+) (3 of 3 - PPSV23, PCV20 or PCV21) 03/02/2020       Problem List Items Addressed This Visit       Callus of foot    Current Assessment & Plan   See procedure note.          Diabetes mellitus type 2 with complications - Primary    Current Assessment & Plan   1) Follow your diabetic diet as directed  2) Regular physical activity as directed   3) Check your blood sugar levels as directed  4) Take your medications as directed  5) Follow up in 3 months            OA (osteoarthritis)    Current Assessment & Plan   OA is  "controlled. Current medical regimen is effective;   Will adjust according to results and monitor.          Plantar wart of both feet    Current Assessment & Plan   Provider cleaned areas to bilateral plantar feet; shaved areas down using derma blade then applied cryo freeze for a few seconds x 3. Pt states areas already feel better.   Instructed to keep areas clean and dry and warts should "slough" off after several days.   Instructed he may have to RTC for this again before next diabetic foot care and to let us know if appt is needed. Pt vu.   Instructed to RTC for any new/worsening/persisting ssx.           Thickened nails     Assessment & Plan             Diabetes mellitus type 2 with complications    Plantar wart of both feet    Thickened nails    Primary osteoarthritis involving multiple joints    Callus of foot       Health Maintenance Topics with due status: Not Due       Topic Last Completion Date    Colorectal Cancer Screening 02/16/2023    Diabetic Eye Exam 11/07/2024    Diabetes Urine Screening 01/24/2025    Lipid Panel 01/24/2025    Hemoglobin A1c 01/24/2025    Foot Exam 01/30/2025    PROSTATE-SPECIFIC ANTIGEN 03/17/2025         Future Appointments   Date Time Provider Department Center   8/4/2025  9:40 AM Zuleima Weber FNP [facility]   1/12/2026  3:00 PM [facility]        No follow-ups on file.    Health Maintenance Due   Topic Date Due    COVID-19 Vaccine (1) Never done    TETANUS VACCINE  Never done    RSV Vaccine (Age 60+ and Pregnant patients) (1 - Risk 60-74 years 1-dose series) Never done    Shingles Vaccine (1 of 2) 01/22/2014    Pneumococcal Vaccines (Age 50+) (3 of 3 - PPSV23, PCV20 or PCV21) 03/02/2020        Signature:  CATIE Winter    Date of encounter: 4/30/25  This note was generated with the assistance of ambient listening technology. Verbal consent was obtained by the patient and accompanying visitor(s) for the " recording of patient appointment to facilitate this note. I attest to having reviewed and edited the generated note for accuracy, though some syntax or spelling errors may persist. Please contact the author of this note for any clarification.

## 2025-05-27 NOTE — ASSESSMENT & PLAN NOTE
OA is controlled. Current medical regimen is effective;   Will adjust according to results and monitor.

## 2025-06-06 PROBLEM — M79.10 MYALGIA: Status: ACTIVE | Noted: 2025-06-06

## 2025-07-31 ENCOUNTER — PATIENT MESSAGE (OUTPATIENT)
Facility: HOSPITAL | Age: 75
End: 2025-07-31
Payer: MEDICARE

## 2025-08-04 ENCOUNTER — OFFICE VISIT (OUTPATIENT)
Dept: FAMILY MEDICINE | Facility: CLINIC | Age: 75
End: 2025-08-04
Payer: MEDICARE

## 2025-08-04 VITALS
RESPIRATION RATE: 19 BRPM | TEMPERATURE: 98 F | DIASTOLIC BLOOD PRESSURE: 82 MMHG | HEIGHT: 72 IN | SYSTOLIC BLOOD PRESSURE: 148 MMHG | OXYGEN SATURATION: 97 % | WEIGHT: 226.19 LBS | BODY MASS INDEX: 30.64 KG/M2 | HEART RATE: 60 BPM

## 2025-08-04 DIAGNOSIS — L60.0 INGROWING NAIL: ICD-10-CM

## 2025-08-04 DIAGNOSIS — B07.0 PLANTAR WART OF BOTH FEET: ICD-10-CM

## 2025-08-04 DIAGNOSIS — L60.2 THICKENED NAILS: ICD-10-CM

## 2025-08-04 DIAGNOSIS — M15.0 PRIMARY OSTEOARTHRITIS INVOLVING MULTIPLE JOINTS: ICD-10-CM

## 2025-08-04 DIAGNOSIS — E11.8 DIABETES MELLITUS TYPE 2 WITH COMPLICATIONS: Primary | ICD-10-CM

## 2025-08-04 DIAGNOSIS — L84 CALLUS OF FOOT: ICD-10-CM

## 2025-08-04 PROCEDURE — 99213 OFFICE O/P EST LOW 20 MIN: CPT | Mod: ,,, | Performed by: NURSE PRACTITIONER

## 2025-08-04 PROCEDURE — 11721 DEBRIDE NAIL 6 OR MORE: CPT | Mod: ,,, | Performed by: NURSE PRACTITIONER

## 2025-08-04 NOTE — ASSESSMENT & PLAN NOTE
Trimmed nails out of corners, instructed on soaking feet daily for approx 10-15 minutes in warm water to help keep areas softened.

## 2025-08-04 NOTE — PROCEDURES
"Foot Care    Date/Time: 8/4/2025 9:40 AM    Performed by: Zuleima Weber FNP  Authorized by: Zuleima Weber FNP    Time out: Immediately prior to procedure a "time out" was called to verify the correct patient, procedure, equipment, support staff and site/side marked as required.      Nail Care Type:  Debride  Location(s): All  (Left 1st Toe, Left 3rd Toe, Left 2nd Toe, Left 4th Toe, Left 5th Toe, Right 1st Toe, Right 2nd Toe, Right 3rd Toe, Right 4th Toe and Right 5th Toe)  Patient tolerance:  Patient tolerated the procedure well with no immediate complications     Calluses to bilateral plantar feet filed smooth. Pt yash well.     "

## 2025-08-04 NOTE — ASSESSMENT & PLAN NOTE
1) Follow your diabetic diet as directed  2) Regular physical activity as directed   3) Check your blood sugar levels as directed  4) Take your medications as directed  5) Follow up in 2 months

## 2025-08-04 NOTE — PROGRESS NOTES
CATIE Winter   [facility]  [address]  [place] [address]     PATIENT NAME: Darrell Urbina  : 1950  DATE: 25  MRN: [id number]      Billing Provider: CATIE Winter  Level of Service:   Patient PCP Information       Provider PCP Type    CATIE Winter General            Reason for Visit / Chief Complaint: footcare (Pt is here for diabetic footcare. He said everything is going well.) and Health Maintenance (COVID-19 Vaccine(1) Never done/TETANUS VACCINE Never done/RSV Vaccine (Age 60+ and Pregnant patients)(1 - Risk 60-74 years 1-dose series) Never done/Shingles Vaccine(1 of 2) due on 2014/Pneumococcal Vaccines (Age 50+)(3 of 3 - PPSV23, PCV20 or PCV21) due on 2020/Hemoglobin A1c due on 2025/Declined all )   Health Maintenance Due   Topic Date Due    COVID-19 Vaccine (1) Never done    TETANUS VACCINE  Never done    RSV Vaccine (Age 60+ and Pregnant patients) (1 - Risk 60-74 years 1-dose series) Never done    Shingles Vaccine (1 of 2) 2014    Pneumococcal Vaccines (Age 50+) (3 of 3 - PPSV23, PCV20 or PCV21) 2020    Hemoglobin A1c  2025          Update PCP  Update Chief Complaint         History of Present Illness / Problem Focused Workflow     History of Present Illness      Pt presents to clinic for diabetic foot care. Pt also has plantar warts to the bottom of both feet but states these are much better. He also has calluses to both feet. Pt has thick, long toe nails  unable to be trimmed with standard clippers. He also has decreased hair growth to feet. He denies numbness to feet. He denies any other needs at this time.         Hemoglobin A1C   Date Value Ref Range Status   2025 6.7 <=7.0 % Final     Comment:       Normal:               <5.7%  Pre-Diabetic:       5.7% to 6.4%  Diabetic:             >6.4%  Diabetic Goal:     <7%   2024 6.3 4.5 - 6.6 % Final     Comment:       Normal:               <5.7%  Pre-Diabetic:       5.7% to  6.4%  Diabetic:             >6.4%  Diabetic Goal:     <7%   01/11/2023 5.9 4.5 - 6.6 % Final     Comment:       Normal:               <5.7%  Pre-Diabetic:       5.7% to 6.4%  Diabetic:             >6.4%  Diabetic Goal:     <7%        CMP  Sodium   Date Value Ref Range Status   01/24/2025 141 136 - 145 mmol/L Final     Potassium   Date Value Ref Range Status   01/24/2025 4.9 3.5 - 5.1 mmol/L Final     Chloride   Date Value Ref Range Status   01/24/2025 106 98 - 107 mmol/L Final     CO2   Date Value Ref Range Status   01/24/2025 23 23 - 31 mmol/L Final     Glucose   Date Value Ref Range Status   01/24/2025 141 (H) 82 - 115 mg/dL Final     BUN   Date Value Ref Range Status   01/24/2025 19 8 - 26 mg/dL Final     Creatinine   Date Value Ref Range Status   01/24/2025 1.52 (H) 0.72 - 1.25 mg/dL Final     Calcium   Date Value Ref Range Status   01/24/2025 9.5 8.8 - 10.0 mg/dL Final     Total Protein   Date Value Ref Range Status   01/24/2025 7.4 5.8 - 7.6 g/dL Final     Albumin   Date Value Ref Range Status   01/24/2025 4.2 3.4 - 4.8 g/dL Final     Bilirubin, Total   Date Value Ref Range Status   01/24/2025 0.5 <=1.5 mg/dL Final     Alk Phos   Date Value Ref Range Status   01/24/2025 42 40 - 150 U/L Final     AST   Date Value Ref Range Status   01/24/2025 41 (H) 5 - 34 U/L Final     ALT   Date Value Ref Range Status   01/24/2025 92 (H) <=55 U/L Final     Anion Gap   Date Value Ref Range Status   01/24/2025 17 (H) 7 - 16 mmol/L Final     eGFR   Date Value Ref Range Status   01/24/2025 48 (L) >=60 mL/min/1.73m2 Final        Lab Results   Component Value Date    WBC 5.27 01/24/2025    RBC 4.88 01/24/2025    HGB 16.4 01/24/2025    HCT 49.6 01/24/2025    .6 (H) 01/24/2025    MCH 33.6 (H) 01/24/2025    MCHC 33.1 01/24/2025    RDW 12.4 01/24/2025     01/24/2025    MPV 10.4 01/24/2025    LYMPH 24.5 (L) 01/24/2025    LYMPH 1.29 01/24/2025    MONO 9.3 (H) 01/24/2025    EOS 0.14 01/24/2025    BASO 0.03 01/24/2025     EOSINOPHIL 2.7 01/24/2025    BASOPHIL 0.6 01/24/2025        Lab Results   Component Value Date    CHOL 225 (H) 01/24/2025    CHOL 258 (H) 01/23/2024    CHOL 195 01/11/2023     Lab Results   Component Value Date    HDL 36 01/24/2025    HDL 35 (L) 01/23/2024    HDL 31 (L) 01/11/2023     Lab Results   Component Value Date    LDLCALC 141 01/24/2025    LDLCALC 160 01/23/2024    LDLCALC 110 01/11/2023     Lab Results   Component Value Date    TRIG 241 (H) 01/24/2025    TRIG 317 (H) 01/23/2024    TRIG 270 (H) 01/11/2023     Lab Results   Component Value Date    CHOLHDL 6.3 01/24/2025    CHOLHDL 7.4 01/23/2024    CHOLHDL 6.3 01/11/2023        Wt Readings from Last 3 Encounters:   08/04/25 0953 102.6 kg (226 lb 3.2 oz)   04/30/25 0927 101.6 kg (224 lb)   01/30/25 0959 102.6 kg (226 lb 4.8 oz)        BP Readings from Last 3 Encounters:   08/04/25 (!) 148/82   04/30/25 (!) 141/78   01/30/25 (!) 167/79        Review of Systems     Review of Systems   Constitutional: Negative.    Eyes: Negative.    Respiratory: Negative.     Cardiovascular: Negative.    Gastrointestinal: Negative.    Endocrine: Negative.    Genitourinary: Negative.    Musculoskeletal: Negative.    Integumentary:         Thick long toe nails, dry skin, calluses, plantar warts to both feet   Allergic/Immunologic: Negative.    Neurological: Negative.    Hematological: Negative.    Psychiatric/Behavioral: Negative.            Medical / Social / Family History     Past Medical History:   Diagnosis Date    Diabetes mellitus, type 2     Elevated PSA     Hyperlipidemia     Hypertension     Hypomagnesemia     Multiple vessel coronary artery disease     OA (osteoarthritis)     Prostate cancer        Past Surgical History:   Procedure Laterality Date    APPENDECTOMY  1958    no    COLONOSCOPY  02/05/2018    CORONARY ARTERY BYPASS GRAFT  2001    sucked    double bypass surgery      FINGER SURGERY Left     FRACTURE SURGERY  1982    right foot    PROSTATE SURGERY  2020     sucked    right knee surgery         Social History    reports that he quit smoking about 41 years ago. His smoking use included cigarettes. He started smoking about 61 years ago. He has a 20 pack-year smoking history. He has been exposed to tobacco smoke. He has never used smokeless tobacco. He reports current alcohol use. He reports that he does not use drugs.    Family History  's family history includes Alcohol abuse in his father; Cancer in his maternal grandmother; Hearing loss in his father and mother; Heart disease in his father; Hyperlipidemia in his father; Hypertension in his father and mother; Lupus in his daughter; Migraines in his daughter; Osteoarthritis in his father; Sudden death in his father; Vision loss in his mother.    Medications and Allergies     Medications  Outpatient Medications Marked as Taking for the 8/4/25 encounter (Office Visit) with Zuleima Weber FNP   Medication Sig Dispense Refill    ascorbic acid, vitamin C, (VITAMIN C) 1000 MG tablet Take 1,000 mg by mouth 2 (two) times daily.      aspirin (ECOTRIN) 81 MG EC tablet Take 81 mg by mouth once daily.      b complex vitamins tablet Take 1 tablet by mouth once daily.      cinnamon bark 500 mg capsule Take 2 capsules by mouth once daily.      coenzyme Q10 100 mg capsule Take 1 capsule by mouth once daily.      cyanocobalamin 500 MCG tablet Take 1 tablet by mouth once daily.      fish,bora,flax oils-om3,6,9no1 (OMEGA 3-6-9) 1,200 mg Cap Take 1 capsule by mouth.      glucosamine HCl 1,500 mg Tab Take 1 tablet by mouth every other day.      glucosamine-chondroitin 500-400 mg tablet Take 1 tablet by mouth 3 (three) times daily.      ibuprofen (ADVIL,MOTRIN) 400 MG tablet Take 400 mg by mouth every 6 (six) hours as needed for Other.      ivermectin (STROMECTOL) 3 mg Tab Take 22.75 mg by mouth once a week.      lisinopriL (PRINIVIL,ZESTRIL) 5 MG tablet Take 1 tablet (5 mg total) by mouth once daily. 90 tablet 3    lisinopriL 10 MG  tablet Take 1 tablet (10 mg total) by mouth once daily. 90 tablet 3    magnesium 250 mg Tab Take 1 tablet by mouth once daily.      magnesium oxide (MAG-OX) 250 mg magnesium Tab Take 250 mg by mouth.      metFORMIN (GLUCOPHAGE) 500 MG tablet Take 1 tablet (500 mg total) by mouth once daily. 90 tablet 3    multivit-min/folic acid/vit K1 (MULTIVIT-MIN-FOLIC ACID-VIT K ORAL) Take by mouth.      multivitamin (THERAGRAN) per tablet Take 1 tablet by mouth once daily.      mupirocin (BACTROBAN) 2 % ointment SMARTSIG:Both Nares Daily      NIFEdipine (ADALAT CC) 30 MG TbSR Take 1 tablet (30 mg total) by mouth once daily. 90 tablet 3    omega-3 fatty acids/fish oil (FISH OIL-OMEGA-3 FATTY ACIDS) 300-1,000 mg capsule Take by mouth once daily.      potassium 99 mg Tab Take 1 tablet by mouth 2 (two) times a day. Every other day      quercetin 500 mg Cap 1 tablet Orally Once a day      sildenafiL (VIAGRA) 100 MG tablet Take  mg by mouth.      vitamin D (VITAMIN D3) 1000 units Tab Take 1,000 Units by mouth once daily.      zinc gluconate 10 mg Lozg Take 1 tablet by mouth once daily.         Allergies  Review of patient's allergies indicates:   Allergen Reactions    Hydrocodone Itching     Other reaction(s): itching       Physical Examination     Vitals:    08/04/25 0953   BP: (!) 148/82   BP Location: Right arm   Patient Position: Sitting   Pulse: 60   Resp: 19   Temp: 98 °F (36.7 °C)   TempSrc: Oral   SpO2: 97%   Weight: 102.6 kg (226 lb 3.2 oz)   Height: 6' (1.829 m)      Physical Exam  Constitutional:       Appearance: Normal appearance. He is obese.   HENT:      Head: Normocephalic.   Eyes:      Extraocular Movements: Extraocular movements intact.   Cardiovascular:      Rate and Rhythm: Normal rate and regular rhythm.      Pulses: Normal pulses.           Dorsalis pedis pulses are 2+ on the right side and 2+ on the left side.        Posterior tibial pulses are 2+ on the right side and 2+ on the left side.      Heart  sounds: Normal heart sounds.   Pulmonary:      Effort: Pulmonary effort is normal.      Breath sounds: Normal breath sounds.   Musculoskeletal:      Right foot: Normal range of motion. No deformity, bunion, Charcot foot, foot drop or prominent metatarsal heads.      Left foot: Normal range of motion. No deformity, bunion, Charcot foot, foot drop or prominent metatarsal heads.        Feet:    Feet:      Right foot:      Protective Sensation: 10 sites tested.  10 sites sensed.      Skin integrity: Callus and dry skin present. No ulcer, blister, skin breakdown, erythema, warmth or fissure.      Toenail Condition: Right toenails are abnormally thick, long and ingrown.      Left foot:      Protective Sensation: 10 sites tested.  10 sites sensed.      Skin integrity: Callus and dry skin present. No ulcer, blister, skin breakdown, erythema, warmth or fissure.      Toenail Condition: Left toenails are abnormally thick, long and ingrown.      Comments: Pt with plantar warts to bilateral feet; nails are ingrowing without infection  Skin:     General: Skin is warm and dry.      Capillary Refill: Capillary refill takes less than 2 seconds.   Neurological:      General: No focal deficit present.      Mental Status: He is alert and oriented to person, place, and time.   Psychiatric:         Mood and Affect: Mood normal.         Behavior: Behavior normal.          Assessment and Plan (including Health Maintenance)      Problem List  Smart Sets  Document Outside HM   :    Plan:     There are no Patient Instructions on file for this visit.       Health Maintenance Due   Topic Date Due    COVID-19 Vaccine (1) Never done    TETANUS VACCINE  Never done    RSV Vaccine (Age 60+ and Pregnant patients) (1 - Risk 60-74 years 1-dose series) Never done    Shingles Vaccine (1 of 2) 01/22/2014    Pneumococcal Vaccines (Age 50+) (3 of 3 - PPSV23, PCV20 or PCV21) 03/02/2020    Hemoglobin A1c  07/24/2025       Problem List Items Addressed This  "Visit       Callus of foot    Current Assessment & Plan   See procedure note.          Diabetes mellitus type 2 with complications - Primary    Current Assessment & Plan   1) Follow your diabetic diet as directed  2) Regular physical activity as directed   3) Check your blood sugar levels as directed  4) Take your medications as directed  5) Follow up in 2 months            Ingrowing nail    Current Assessment & Plan   Trimmed nails out of corners, instructed on soaking feet daily for approx 10-15 minutes in warm water to help keep areas softened.          OA (osteoarthritis)    Current Assessment & Plan   OA is controlled. Current medical regimen is effective;   Will adjust according to results and monitor.          Plantar wart of both feet    Current Assessment & Plan   Pt states areas are much better and are actually not hurting him today. Will monitor if more debridement is needed on next visit.          Thickened nails    Current Assessment & Plan   Taking good care of your feet is very important!  1) Look at your bare feet daily for blisters, red spots, cuts, cracks, swelling or sores. If you can not see well, you can use a mirror to look or have someone help you.  2) Report promptly to your health care provider if you notice any changes in your feet as noted above or if your feet change colors, shape or just do not "feel right" for example: if your feet become less sensitive or begin hurting.  3) Wash your feet everyday. Use warm  not hot water. Check the water temperature with your wrist or other body part - not your feet.  4) Dry your feet well. Make sure you pat them dry. Do not rub the skin on your feet too hard. Make sure to dry well between your toes. Allowing moisture to stay between your toes or on your feet could lead to infection.  5) Keep your feet soft and smooth. Rub a thin coat of lotion on your feet each day but do not put lotion between your toes.  6) Wear socks and shoes at all times.  7) " Never walk barefoot.           Assessment & Plan             Diabetes mellitus type 2 with complications    Plantar wart of both feet    Primary osteoarthritis involving multiple joints    Thickened nails    Callus of foot    Ingrowing nail       Health Maintenance Topics with due status: Not Due       Topic Last Completion Date    Colorectal Cancer Screening 02/16/2023    Influenza Vaccine 11/05/2024    Diabetic Eye Exam 11/07/2024    Diabetes Urine Screening 01/24/2025    Lipid Panel 01/24/2025    PROSTATE-SPECIFIC ANTIGEN 03/17/2025    Foot Exam 04/30/2025         Future Appointments   Date Time Provider Department Center   10/9/2025  9:20 AM Zuleima Weber FNP [facility]   1/12/2026  3:00 PM AWV NURSE, [facility]        No follow-ups on file.    Health Maintenance Due   Topic Date Due    COVID-19 Vaccine (1) Never done    TETANUS VACCINE  Never done    RSV Vaccine (Age 60+ and Pregnant patients) (1 - Risk 60-74 years 1-dose series) Never done    Shingles Vaccine (1 of 2) 01/22/2014    Pneumococcal Vaccines (Age 50+) (3 of 3 - PPSV23, PCV20 or PCV21) 03/02/2020    Hemoglobin A1c  07/24/2025        Signature:  CATIE Winter    Date of encounter: 8/4/25  This note was generated with the assistance of ambient listening technology. Verbal consent was obtained by the patient and accompanying visitor(s) for the recording of patient appointment to facilitate this note. I attest to having reviewed and edited the generated note for accuracy, though some syntax or spelling errors may persist. Please contact the author of this note for any clarification.

## 2025-08-04 NOTE — ASSESSMENT & PLAN NOTE
Pt states areas are much better and are actually not hurting him today. Will monitor if more debridement is needed on next visit.

## 2025-08-19 ENCOUNTER — TELEPHONE (OUTPATIENT)
Dept: FAMILY MEDICINE | Facility: CLINIC | Age: 75
End: 2025-08-19
Payer: MEDICARE